# Patient Record
Sex: MALE | Race: WHITE | NOT HISPANIC OR LATINO | Employment: PART TIME | URBAN - METROPOLITAN AREA
[De-identification: names, ages, dates, MRNs, and addresses within clinical notes are randomized per-mention and may not be internally consistent; named-entity substitution may affect disease eponyms.]

---

## 2017-01-26 ENCOUNTER — OFFICE VISIT (OUTPATIENT)
Dept: NEUROLOGY | Facility: CLINIC | Age: 47
End: 2017-01-26

## 2017-01-26 VITALS
DIASTOLIC BLOOD PRESSURE: 70 MMHG | WEIGHT: 178 LBS | OXYGEN SATURATION: 98 % | SYSTOLIC BLOOD PRESSURE: 126 MMHG | BODY MASS INDEX: 26.36 KG/M2 | HEIGHT: 69 IN | HEART RATE: 80 BPM

## 2017-01-26 DIAGNOSIS — G43.019 INTRACTABLE MIGRAINE WITHOUT AURA AND WITHOUT STATUS MIGRAINOSUS: ICD-10-CM

## 2017-01-26 DIAGNOSIS — M54.12 CERVICAL RADICULOPATHY: ICD-10-CM

## 2017-01-26 PROCEDURE — 99213 OFFICE O/P EST LOW 20 MIN: CPT | Performed by: PSYCHIATRY & NEUROLOGY

## 2017-01-26 RX ORDER — SUMATRIPTAN AND NAPROXEN SODIUM 85; 500 MG/1; MG/1
1 TABLET, FILM COATED ORAL
Qty: 12 TABLET | Refills: 4 | Status: SHIPPED | OUTPATIENT
Start: 2017-01-26 | End: 2017-02-25

## 2017-01-26 NOTE — LETTER
January 30, 2017     Jodie Van MD  [address]  [address]  [facility] [address]    Patient: Govind Manriquez   YOB: 1970   Date of Visit: 1/26/2017       Dear Dr. Rehan MD:    Thank you for referring Govind Manriquez to me for evaluation. Below are the relevant portions of my assessment and plan of care.    If you have questions, please do not hesitate to call me. I look forward to following Govind along with you.         Sincerely,        Sanna Barr MD        CC: No Recipients  Sanna Barr MD  1/30/2017 11:16 AM  Sign at close encounter  Subjective   Govind Manriquez is a 46 y.o. male with history of chronic migraines, intractable with cervical radiculopathy issues came for follow-up appointment.    History of Present Illness   He was unaccompanied and according to him, history having 2-3 migraine headaches a week, pain is all over, throbbing nature, severity 7/10, associated with nausea, photophobia and phonophobia and complaint with the medication Treximet and denies any side effects.  Still complaining of neck pain issues and radiation from the neck to the right shoulders but denies any radiation down to the upper extremities and denies any blurred vision, double vision, weakness, tingling numbness or balance problems when walking.  Tried nortriptyline in the past and denies any side effects.  Denies any falls, passing out spells or recent hospitalizations since last visit.    The following portions of the patient's history were reviewed and updated as appropriate: allergies, current medications, past family history, past medical history, past social history, past surgical history and problem list.    Review of Systems   Constitutional: Positive for fatigue. Negative for chills and fever.   HENT: Negative for hearing loss, tinnitus and trouble swallowing.    Eyes: Positive for visual disturbance. Negative for pain and itching.   Respiratory: Negative for shortness of  breath and wheezing.    Cardiovascular: Negative for chest pain and palpitations.   Gastrointestinal: Positive for nausea. Negative for vomiting.   Endocrine: Negative for cold intolerance and heat intolerance.   Genitourinary: Negative for difficulty urinating and urgency.   Musculoskeletal: Negative for back pain, neck pain and neck stiffness.   Skin: Negative for rash and wound.   Allergic/Immunologic: Negative for environmental allergies and food allergies.   Neurological: Positive for dizziness and headaches. Negative for numbness.   Hematological: Negative for adenopathy. Does not bruise/bleed easily.   Psychiatric/Behavioral: Negative for confusion and sleep disturbance. The patient is not nervous/anxious.        Objective   Physical Exam  GENERAL EXAMINATION : Patient is well-developed, well-nourished, well-groomed, alert and approriate in no apparent distress.  HEENT: Normocephalic, normal funduscopic exam, no visible oral lesions.  NECK : Normal range of motion, no tenderness, no malalignment.  CARDIAC : Regular rate and rhythm, no murmurs.  CHEST : Clear to auscultation, bilateral.   No wheezing .  ABDOMEN : Soft, non tender and non distended. EXTREMITIES: No edema. SKIN : No rashes or lesions visible. MUSCULOSKELETAL : No muscle weakness or muscle pain. No joint pains. PSYCHIATRIC : Awake and follwoing verbal commands well.     NEUROLOGICAL EXAMINATION  :  Higher integrative functions : No aphasia or dysarthria.  CRANIAL NERVES : Cranial nerve II: Normal visual acuity and visual fields.  On funduscopic exam, discs are flat with sharp margins cranial nerves III, IV, VI: Extraocular movements are full without nystagmus; pupils are equal, round and reactive to light.  Cranial nerve V: Normal facial sensation and strength of muscles of mastication.  Cranial nerve: VII: Facial movements are symmetric.  No weakness.  Cranial nerve VIII: Auditory acuity is normal.  Cranial nerve IX, X: Symmetric palate  movement.  Cranial nerve XI sternocleidomastoid and trapezius are normal.  Cranial nerve XII: Tongue in the midline with no atrophy or fasciculations.      MOTOR : Normal muscle strength and tone.  SENSATION : Normal to light touch, pinprick, vibration sensations intact and Romberg is negative.  MUSCLE STRETCH REFLEXES : Normal and symmetric in the upper extremities and lower extremities and the plantar responses are flexor bilaterally. COORDINATION : Finger to nose test showed no dysmetria.  Rapid alternating movements are normal.   GAIT : Walks on heels, toes, and tandem walk without difficulty.    Assessment/Plan   Govind was seen today for migraine.    Diagnoses and all orders for this visit:    Intractable migraine without aura and without status migrainosus    Cervical radiculopathy    Other orders  -     SUMAtriptan-naproxen (TREXIMET)  MG per tablet; Take 1 tablet by mouth Every 2 (Two) Hours As Needed for migraine for up to 30 days.     46-year-old right-handed white male with history of chronic migraines, intractable and cervical radiculopathy came for follow-up appointment.  On exam, no new focal deficits were seen.  Discussed with the patient regarding his signs and symptoms and told him to continue Treximet 85/500 mg one tablet by mouth at onset of headaches, can repeat one more tablet in 2 hours but not more than 2 per day and for per week.  Discussed about prophylactic medication patient wants to hold on any medication for now and will consider in future for worsening of his headaches.  Also discussed about physical therapy for neck pain issues but patient wants to hold on therapy for now.  Discussed about migraine triggers and medication or use headaches.  Will follow-up in 6 months or earlier if problems arise.    Thank you for allowing me to participate in the care of the patient.  Please feel free to call for any questions at your convenience.    Yours sincerely,    Sanna Barr M.D

## 2017-01-26 NOTE — MR AVS SNAPSHOT
Govind Manriquez   1/26/2017 2:00 PM   Office Visit    Dept Phone:  [phone]   Encounter #:  [id number]    Provider:  Sanna Barr MD   Department:  [facility] NEUROLOGY                Your Full Care Plan              Your Updated Medication List          This list is accurate as of: 1/26/17  2:08 PM.  Always use your most recent med list.                rosuvastatin 20 MG tablet   Commonly known as:  CRESTOR   Take 1 tablet by mouth Daily.       SUMAtriptan-naproxen  MG per tablet   Commonly known as:  TREXIMET   Take 1 tablet by mouth Every 2 (Two) Hours As Needed for migraine.               Instructions     None    Patient Instructions History      Upcoming Appointments     Visit Type Date Time Department    FOLLOW UP 1/26/2017  2:00 PM [facility]    PHYSICAL 4/25/2017  9:00 AM [facility]    FOLLOW UP 7/27/2017  8:00 AM [facility]      MyChart Signup     Our records indicate that you have an active [employer] account.    You can view your After Visit Summary by going to V[employer] and logging in with your [email or web address] username and password.  If you don't have a [email or web address] username and password but a parent or guardian has access to your record, the parent or guardian should login with their own [email or web address] username and password and access your record to view the After Visit Summary.    If you have questions, you can email [email or web address] or call [phone] to talk to our [email or web address] staff.  Remember, [email or web address] is NOT to be used for urgent needs.  For medical emergencies, dial 911.               Other Info from Your Visit           Your Appointments     Apr 25, 2017  9:00 AM EDT   Physical with Jodie Van MD   [facility] INTERNAL MEDICINE (--)    [address] [place] [address]   [phone]           Arrive 15 minutes prior to appointment.            Jul 27, 2017   "8:00 AM EDT   Follow Up with Sanna Barr MD   [facility] NEUROLOGY (--)    [address]  [facility] [address] [phone]           Arrive 15 minutes prior to appointment.              Allergies     Zocor [Simvastatin]      fatigue      Reason for Visit     Migraine           Vital Signs     Blood Pressure Pulse Height Weight Oxygen Saturation Body Mass Index    126/70 80 69\" (175.3 cm) 178 lb (80.7 kg) 98% 26.29 kg/m2    Smoking Status                   Never Smoker             "

## 2017-01-26 NOTE — PROGRESS NOTES
Subjective   Govind Manriquez is a 46 y.o. male with history of chronic migraines, intractable with cervical radiculopathy issues came for follow-up appointment.    History of Present Illness   He was unaccompanied and according to him, history having 2-3 migraine headaches a week, pain is all over, throbbing nature, severity 7/10, associated with nausea, photophobia and phonophobia and complaint with the medication Treximet and denies any side effects.  Still complaining of neck pain issues and radiation from the neck to the right shoulders but denies any radiation down to the upper extremities and denies any blurred vision, double vision, weakness, tingling numbness or balance problems when walking.  Tried nortriptyline in the past and denies any side effects.  Denies any falls, passing out spells or recent hospitalizations since last visit.    The following portions of the patient's history were reviewed and updated as appropriate: allergies, current medications, past family history, past medical history, past social history, past surgical history and problem list.    Review of Systems   Constitutional: Positive for fatigue. Negative for chills and fever.   HENT: Negative for hearing loss, tinnitus and trouble swallowing.    Eyes: Positive for visual disturbance. Negative for pain and itching.   Respiratory: Negative for shortness of breath and wheezing.    Cardiovascular: Negative for chest pain and palpitations.   Gastrointestinal: Positive for nausea. Negative for vomiting.   Endocrine: Negative for cold intolerance and heat intolerance.   Genitourinary: Negative for difficulty urinating and urgency.   Musculoskeletal: Negative for back pain, neck pain and neck stiffness.   Skin: Negative for rash and wound.   Allergic/Immunologic: Negative for environmental allergies and food allergies.   Neurological: Positive for dizziness and headaches. Negative for numbness.   Hematological: Negative for adenopathy. Does  not bruise/bleed easily.   Psychiatric/Behavioral: Negative for confusion and sleep disturbance. The patient is not nervous/anxious.        Objective   Physical Exam  GENERAL EXAMINATION : Patient is well-developed, well-nourished, well-groomed, alert and approriate in no apparent distress.  HEENT: Normocephalic, normal funduscopic exam, no visible oral lesions.  NECK : Normal range of motion, no tenderness, no malalignment.  CARDIAC : Regular rate and rhythm, no murmurs.  CHEST : Clear to auscultation, bilateral.   No wheezing .  ABDOMEN : Soft, non tender and non distended. EXTREMITIES: No edema. SKIN : No rashes or lesions visible. MUSCULOSKELETAL : No muscle weakness or muscle pain. No joint pains. PSYCHIATRIC : Awake and follwoing verbal commands well.     NEUROLOGICAL EXAMINATION  :  Higher integrative functions : No aphasia or dysarthria.  CRANIAL NERVES : Cranial nerve II: Normal visual acuity and visual fields.  On funduscopic exam, discs are flat with sharp margins cranial nerves III, IV, VI: Extraocular movements are full without nystagmus; pupils are equal, round and reactive to light.  Cranial nerve V: Normal facial sensation and strength of muscles of mastication.  Cranial nerve: VII: Facial movements are symmetric.  No weakness.  Cranial nerve VIII: Auditory acuity is normal.  Cranial nerve IX, X: Symmetric palate movement.  Cranial nerve XI sternocleidomastoid and trapezius are normal.  Cranial nerve XII: Tongue in the midline with no atrophy or fasciculations.      MOTOR : Normal muscle strength and tone.  SENSATION : Normal to light touch, pinprick, vibration sensations intact and Romberg is negative.  MUSCLE STRETCH REFLEXES : Normal and symmetric in the upper extremities and lower extremities and the plantar responses are flexor bilaterally. COORDINATION : Finger to nose test showed no dysmetria.  Rapid alternating movements are normal.   GAIT : Walks on heels, toes, and tandem walk without  difficulty.    Assessment/Plan   Govind was seen today for migraine.    Diagnoses and all orders for this visit:    Intractable migraine without aura and without status migrainosus    Cervical radiculopathy    Other orders  -     SUMAtriptan-naproxen (TREXIMET)  MG per tablet; Take 1 tablet by mouth Every 2 (Two) Hours As Needed for migraine for up to 30 days.     46-year-old right-handed white male with history of chronic migraines, intractable and cervical radiculopathy came for follow-up appointment.  On exam, no new focal deficits were seen.  Discussed with the patient regarding his signs and symptoms and told him to continue Treximet 85/500 mg one tablet by mouth at onset of headaches, can repeat one more tablet in 2 hours but not more than 2 per day and for per week.  Discussed about prophylactic medication patient wants to hold on any medication for now and will consider in future for worsening of his headaches.  Also discussed about physical therapy for neck pain issues but patient wants to hold on therapy for now.  Discussed about migraine triggers and medication or use headaches.  Will follow-up in 6 months or earlier if problems arise.    Thank you for allowing me to participate in the care of the patient.  Please feel free to call for any questions at your convenience.    Yours sincerely,    Sanna Barr M.D

## 2017-04-17 LAB
ALBUMIN SERPL-MCNC: 4.7 G/DL (ref 3.5–5.2)
ALBUMIN/GLOB SERPL: 1.8 G/DL
ALP SERPL-CCNC: 54 U/L (ref 39–117)
ALT SERPL-CCNC: 27 U/L (ref 1–41)
AST SERPL-CCNC: 28 U/L (ref 1–40)
BILIRUB SERPL-MCNC: 0.6 MG/DL (ref 0.1–1.2)
BUN SERPL-MCNC: 17 MG/DL (ref 6–20)
BUN/CREAT SERPL: 16 (ref 7–25)
CALCIUM SERPL-MCNC: 10.2 MG/DL (ref 8.6–10.5)
CHLORIDE SERPL-SCNC: 100 MMOL/L (ref 98–107)
CHOLEST SERPL-MCNC: 276 MG/DL (ref 0–200)
CO2 SERPL-SCNC: 26.4 MMOL/L (ref 22–29)
CREAT SERPL-MCNC: 1.06 MG/DL (ref 0.76–1.27)
GLOBULIN SER CALC-MCNC: 2.6 GM/DL
GLUCOSE SERPL-MCNC: 92 MG/DL (ref 65–99)
HDLC SERPL-MCNC: 55 MG/DL (ref 40–60)
LDLC SERPL CALC-MCNC: 189 MG/DL (ref 0–100)
LDLC/HDLC SERPL: 3.44 {RATIO}
POTASSIUM SERPL-SCNC: 4.6 MMOL/L (ref 3.5–5.2)
PROT SERPL-MCNC: 7.3 G/DL (ref 6–8.5)
SODIUM SERPL-SCNC: 141 MMOL/L (ref 136–145)
TRIGL SERPL-MCNC: 160 MG/DL (ref 0–150)
TSH SERPL DL<=0.005 MIU/L-ACNC: 1.12 MIU/ML (ref 0.27–4.2)
VLDLC SERPL CALC-MCNC: 32 MG/DL (ref 5–40)

## 2017-04-20 ENCOUNTER — RESULTS ENCOUNTER (OUTPATIENT)
Dept: INTERNAL MEDICINE | Facility: CLINIC | Age: 47
End: 2017-04-20

## 2017-04-20 DIAGNOSIS — E78.5 HYPERLIPIDEMIA, UNSPECIFIED HYPERLIPIDEMIA TYPE: ICD-10-CM

## 2017-04-25 ENCOUNTER — HOSPITAL ENCOUNTER (OUTPATIENT)
Dept: CARDIOLOGY | Facility: HOSPITAL | Age: 47
Discharge: HOME | End: 2017-04-25
Admitting: INTERNAL MEDICINE

## 2017-04-25 ENCOUNTER — HOSPITAL ENCOUNTER (OUTPATIENT)
Dept: ULTRASOUND IMAGING | Facility: HOSPITAL | Age: 47
Discharge: HOME | End: 2017-04-25
Admitting: INTERNAL MEDICINE

## 2017-04-25 ENCOUNTER — OFFICE VISIT (OUTPATIENT)
Dept: INTERNAL MEDICINE | Facility: CLINIC | Age: 47
End: 2017-04-25

## 2017-04-25 ENCOUNTER — TRANSCRIBE ORDERS (OUTPATIENT)
Dept: ADMINISTRATIVE | Facility: HOSPITAL | Age: 47
End: 2017-04-25

## 2017-04-25 VITALS
DIASTOLIC BLOOD PRESSURE: 74 MMHG | SYSTOLIC BLOOD PRESSURE: 110 MMHG | HEIGHT: 70 IN | HEART RATE: 56 BPM | BODY MASS INDEX: 26.48 KG/M2 | WEIGHT: 185 LBS

## 2017-04-25 VITALS
HEIGHT: 69 IN | SYSTOLIC BLOOD PRESSURE: 110 MMHG | OXYGEN SATURATION: 95 % | BODY MASS INDEX: 27.61 KG/M2 | DIASTOLIC BLOOD PRESSURE: 80 MMHG | HEART RATE: 91 BPM | WEIGHT: 186.4 LBS

## 2017-04-25 DIAGNOSIS — L21.9 SEBORRHEIC DERMATITIS: ICD-10-CM

## 2017-04-25 DIAGNOSIS — G43.019 INTRACTABLE MIGRAINE WITHOUT AURA AND WITHOUT STATUS MIGRAINOSUS: ICD-10-CM

## 2017-04-25 DIAGNOSIS — Z00.00 HEALTH CARE MAINTENANCE: Primary | ICD-10-CM

## 2017-04-25 DIAGNOSIS — Z13.9 SCREENING: ICD-10-CM

## 2017-04-25 DIAGNOSIS — E04.1 THYROID NODULE: ICD-10-CM

## 2017-04-25 DIAGNOSIS — Z13.9 SCREENING: Primary | ICD-10-CM

## 2017-04-25 DIAGNOSIS — E78.5 HYPERLIPIDEMIA, UNSPECIFIED HYPERLIPIDEMIA TYPE: ICD-10-CM

## 2017-04-25 LAB
BH CV ECHO MEAS - DIST AO DIAM: 1.27 CM
BH CV VAS BP LEFT ARM: NORMAL MMHG
BH CV VAS BP RIGHT ARM: NORMAL MMHG
BH CV XLRA MEAS - MID AO DIAM: 1.62 CM
BH CV XLRA MEAS - PAD LEFT ABI DP: 1.16
BH CV XLRA MEAS - PAD LEFT ABI PT: 1.12
BH CV XLRA MEAS - PAD LEFT ARM: 106 MMHG
BH CV XLRA MEAS - PAD LEFT LEG DP: 128 MMHG
BH CV XLRA MEAS - PAD LEFT LEG PT: 124 MMHG
BH CV XLRA MEAS - PAD RIGHT ABI DP: 1.18
BH CV XLRA MEAS - PAD RIGHT ABI PT: 1.14
BH CV XLRA MEAS - PAD RIGHT ARM: 110 MMHG
BH CV XLRA MEAS - PAD RIGHT LEG DP: 130 MMHG
BH CV XLRA MEAS - PAD RIGHT LEG PT: 126 MMHG
BH CV XLRA MEAS - PROX AO DIAM: 2.03 CM
BH CV XLRA MEAS LEFT ICA/CCA RATIO: 0.87
BH CV XLRA MEAS LEFT MID CCA PSV: NORMAL CM/SEC
BH CV XLRA MEAS LEFT MID ICA PSV: NORMAL CM/SEC
BH CV XLRA MEAS LEFT PROX ECA PSV: NORMAL CM/SEC
BH CV XLRA MEAS RIGHT ICA/CCA RATIO: 1.28
BH CV XLRA MEAS RIGHT MID CCA PSV: NORMAL CM/SEC
BH CV XLRA MEAS RIGHT MID ICA PSV: NORMAL CM/SEC
BH CV XLRA MEAS RIGHT PROX ECA PSV: NORMAL CM/SEC

## 2017-04-25 PROCEDURE — 76536 US EXAM OF HEAD AND NECK: CPT

## 2017-04-25 PROCEDURE — 99396 PREV VISIT EST AGE 40-64: CPT | Performed by: INTERNAL MEDICINE

## 2017-04-25 PROCEDURE — 93799 UNLISTED CV SVC/PROCEDURE: CPT

## 2017-04-25 PROCEDURE — 99213 OFFICE O/P EST LOW 20 MIN: CPT | Performed by: INTERNAL MEDICINE

## 2017-04-25 PROCEDURE — 93000 ELECTROCARDIOGRAM COMPLETE: CPT | Performed by: INTERNAL MEDICINE

## 2017-04-25 RX ORDER — KETOCONAZOLE 20 MG/ML
1 SHAMPOO TOPICAL 2 TIMES WEEKLY
COMMUNITY
End: 2017-04-25 | Stop reason: SDUPTHER

## 2017-04-25 RX ORDER — SUMATRIPTAN AND NAPROXEN SODIUM 85; 500 MG/1; MG/1
1 TABLET, FILM COATED ORAL
COMMUNITY
End: 2017-12-18 | Stop reason: SDUPTHER

## 2017-04-25 RX ORDER — KETOCONAZOLE 20 MG/ML
1 SHAMPOO TOPICAL 2 TIMES WEEKLY
Qty: 120 ML | Refills: 1 | Status: SHIPPED | OUTPATIENT
Start: 2017-04-27 | End: 2021-02-19

## 2017-04-25 RX ORDER — ROSUVASTATIN CALCIUM 20 MG/1
20 TABLET, COATED ORAL DAILY
Qty: 90 TABLET | Refills: 3 | Status: SHIPPED | OUTPATIENT
Start: 2017-04-25 | End: 2018-05-03 | Stop reason: SDUPTHER

## 2017-04-25 NOTE — PROGRESS NOTES
"Subjective   Govind Manriquez is a 46 y.o. male and is here for a comprehensive physical exam. The patient reports problems - migraine HA.    Likely at least in part related to cervical radiculopathy.  He is having HA 1-2x a week.  The treximet does help and he does not want to take a prophyllactic  HE has had elevated cholesterol.  HE has not been taking the cholesterol meds reg as he missed his meds for spring break  He does have occas seb derm and uses occas niroal.  He uses a bottle every 2 years      Do you take any herbs or supplements that were not prescribed by a doctor? occas uses 81mg     Social History: HE does exercise reg.    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    Objective   /80 (BP Location: Left arm, Patient Position: Sitting)  Pulse 91  Ht 69\" (175.3 cm)  Wt 186 lb 6.4 oz (84.6 kg)  SpO2 95%  BMI 27.53 kg/m2    General Appearance:    Alert, cooperative, no distress, appears stated age   Head:    Normocephalic, without obvious abnormality, atraumatic   Eyes:    PERRL, conjunctiva/corneas clear, EOM's intact, fundi     benign, both eyes        Ears:    Normal TM's and external ear canals, both ears   Nose:   Nares normal, septum midline, mucosa normal, no drainage    or sinus " tenderness   Throat:   Lips, mucosa, and tongue normal; teeth and gums normal   Neck:   Supple, symmetrical, trachea midline, no adenopathy;        thyroid:  No enlargement/tenderness/nodules; no carotid    bruit or JVD   Back:     Symmetric, no curvature, ROM normal, no CVA tenderness   Lungs:     Clear to auscultation bilaterally, respirations unlabored   Chest wall:    No tenderness or deformity   Heart:    Regular rate and rhythm, S1 and S2 normal, no murmur, rub   or gallop   Abdomen:     Soft, non-tender, bowel sounds active all four quadrants,     no masses, no organomegaly           Extremities:   Extremities normal, atraumatic, no cyanosis or edema   Pulses:   2+ and symmetric all extremities   Skin:   Skin color, texture, turgor normal, no rashes or lesions   Lymph nodes:   Cervical, supraclavicular, and axillary nodes normal   Neurologic:   CNII-XII intact. Normal strength, sensation and reflexes       throughout       Medications:   Current Outpatient Prescriptions:   •  ketoconazole (NIZORAL) 2 % shampoo, Apply 1 application topically 2 (Two) Times a Week., Disp: , Rfl:   •  rosuvastatin (CRESTOR) 20 MG tablet, Take 1 tablet by mouth Daily., Disp: 90 tablet, Rfl: 3  •  SUMAtriptan-naproxen (TREXIMET)  MG per tablet, Take 1 tablet by mouth Every 2 (Two) Hours As Needed for migraine., Disp: , Rfl:      EKG: Normal sinus rhythm without any acute ST changes.  There is no baseline available for comparison    Assessment/Plan   Healthy male exam.      1. Healthcare Maintenance:  2. Patient Counseling:  --Nutrition: Stressed importance of moderation in sodium/caffeine intake, saturated fat and cholesterol, caloric balance, sufficient intake of fresh fruits, vegetables, fiber, calcium and vit D  --Exercise: Stressed the importance of regular exercise. rec 30 minutes a day   --Dental health: Discussed importance of regular tooth brushing, flossing, and dental visits he does this  --Immunizations reviewed.  Gets flu shot  --Discussed benefits of screening colonoscopy start at age 50  3. HPL- he is going to be more compliant with His Crestor.  He does have a significant family history of coronary artery disease we will check a vascular screen   4. Thyroid nodule-  We'll follow this up a couple of times.  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  We will repeat this next month   5. seb derm-  I have refilled with ketoconazole He uses this only occasionally   6.  Migraine headaches: Patient is still having a couple week.  He also has cervical radiculopathy.  He is seen the neurologist and I have also recommended some physical therapy as I believe that will help him with these headaches.  He will me know if he changes his mind

## 2017-04-27 DIAGNOSIS — E04.1 THYROID NODULE: Primary | ICD-10-CM

## 2017-09-08 ENCOUNTER — TELEPHONE (OUTPATIENT)
Dept: NEUROLOGY | Facility: CLINIC | Age: 47
End: 2017-09-08

## 2017-09-08 DIAGNOSIS — G43.719 INTRACTABLE CHRONIC MIGRAINE WITHOUT AURA AND WITHOUT STATUS MIGRAINOSUS: Primary | ICD-10-CM

## 2017-09-08 NOTE — TELEPHONE ENCOUNTER
----- Message from Barbara Stephenson sent at 9/8/2017 10:44 AM EDT -----  Contact: [phone]  Patient would like to be moved to [facility].       Sent referral.

## 2017-10-20 DIAGNOSIS — E78.5 HYPERLIPIDEMIA, UNSPECIFIED HYPERLIPIDEMIA TYPE: ICD-10-CM

## 2017-11-02 LAB
ALBUMIN SERPL-MCNC: 4.5 G/DL (ref 3.5–5.2)
ALBUMIN/GLOB SERPL: 1.6 G/DL
ALP SERPL-CCNC: 63 U/L (ref 39–117)
ALT SERPL-CCNC: 27 U/L (ref 1–41)
AST SERPL-CCNC: 30 U/L (ref 1–40)
BILIRUB SERPL-MCNC: 0.5 MG/DL (ref 0.1–1.2)
BUN SERPL-MCNC: 14 MG/DL (ref 6–20)
BUN/CREAT SERPL: 13.5 (ref 7–25)
CALCIUM SERPL-MCNC: 10 MG/DL (ref 8.6–10.5)
CHLORIDE SERPL-SCNC: 101 MMOL/L (ref 98–107)
CHOLEST SERPL-MCNC: 245 MG/DL (ref 0–200)
CO2 SERPL-SCNC: 26.6 MMOL/L (ref 22–29)
CREAT SERPL-MCNC: 1.04 MG/DL (ref 0.76–1.27)
GFR SERPLBLD CREATININE-BSD FMLA CKD-EPI: 77 ML/MIN/1.73
GFR SERPLBLD CREATININE-BSD FMLA CKD-EPI: 93 ML/MIN/1.73
GLOBULIN SER CALC-MCNC: 2.9 GM/DL
GLUCOSE SERPL-MCNC: 102 MG/DL (ref 65–99)
HDLC SERPL-MCNC: 61 MG/DL (ref 40–60)
LDLC SERPL CALC-MCNC: 163 MG/DL (ref 0–100)
LDLC/HDLC SERPL: 2.67 {RATIO}
POTASSIUM SERPL-SCNC: 4.5 MMOL/L (ref 3.5–5.2)
PROT SERPL-MCNC: 7.4 G/DL (ref 6–8.5)
SODIUM SERPL-SCNC: 141 MMOL/L (ref 136–145)
TRIGL SERPL-MCNC: 105 MG/DL (ref 0–150)
VLDLC SERPL CALC-MCNC: 21 MG/DL (ref 5–40)

## 2017-11-08 ENCOUNTER — OFFICE VISIT (OUTPATIENT)
Dept: INTERNAL MEDICINE | Facility: CLINIC | Age: 47
End: 2017-11-08

## 2017-11-08 VITALS
BODY MASS INDEX: 26.92 KG/M2 | OXYGEN SATURATION: 96 % | DIASTOLIC BLOOD PRESSURE: 72 MMHG | SYSTOLIC BLOOD PRESSURE: 112 MMHG | HEIGHT: 70 IN | WEIGHT: 188 LBS | HEART RATE: 75 BPM

## 2017-11-08 DIAGNOSIS — G43.019 INTRACTABLE MIGRAINE WITHOUT AURA AND WITHOUT STATUS MIGRAINOSUS: ICD-10-CM

## 2017-11-08 DIAGNOSIS — E78.5 HYPERLIPIDEMIA, UNSPECIFIED HYPERLIPIDEMIA TYPE: Primary | ICD-10-CM

## 2017-11-08 DIAGNOSIS — M54.2 NECK PAIN: ICD-10-CM

## 2017-11-08 PROCEDURE — 99214 OFFICE O/P EST MOD 30 MIN: CPT | Performed by: INTERNAL MEDICINE

## 2017-12-18 ENCOUNTER — OFFICE VISIT (OUTPATIENT)
Dept: NEUROLOGY | Facility: CLINIC | Age: 47
End: 2017-12-18

## 2017-12-18 VITALS
HEIGHT: 69 IN | WEIGHT: 187 LBS | SYSTOLIC BLOOD PRESSURE: 122 MMHG | DIASTOLIC BLOOD PRESSURE: 78 MMHG | BODY MASS INDEX: 27.7 KG/M2

## 2017-12-18 DIAGNOSIS — G43.019 INTRACTABLE MIGRAINE WITHOUT AURA AND WITHOUT STATUS MIGRAINOSUS: ICD-10-CM

## 2017-12-18 DIAGNOSIS — M54.12 CERVICAL RADICULOPATHY: ICD-10-CM

## 2017-12-18 DIAGNOSIS — M54.2 NECK PAIN: Primary | ICD-10-CM

## 2017-12-18 PROCEDURE — 99214 OFFICE O/P EST MOD 30 MIN: CPT | Performed by: PSYCHIATRY & NEUROLOGY

## 2017-12-18 RX ORDER — TOPIRAMATE 25 MG/1
25 TABLET ORAL NIGHTLY
Qty: 30 TABLET | Refills: 2 | Status: SHIPPED | OUTPATIENT
Start: 2017-12-18 | End: 2018-01-02 | Stop reason: SDUPTHER

## 2017-12-18 RX ORDER — SUMATRIPTAN AND NAPROXEN SODIUM 85; 500 MG/1; MG/1
1 TABLET, FILM COATED ORAL
Qty: 9 TABLET | Refills: 11 | Status: SHIPPED | OUTPATIENT
Start: 2017-12-18 | End: 2018-06-18 | Stop reason: SDUPTHER

## 2017-12-18 NOTE — PROGRESS NOTES
Subjective:     Patient ID: Govind Manriquez is a 47 y.o. male.    History of Present Illness     Patient was seen in the office for follow-up of neck pain and migraine.  This is the first time I've seen this patient.  He has seen Dr. Barr in the past.  For neck pain he was prescribed physical therapy at his last visit here in January 17.  He did not go.  He is taking more more Treximet.  Used to take 1 or 2 months now is taking to 3 a week.  He usually has neck pain which is chronic.  MRI of the cervical spine was done in May 2016.  His headaches usually have nausea and also some visual disturbance with them.  Treximet helps for about days and the headache oftentimes will come back.  He has been on amitriptyline and nortriptyline in the past without benefit.  The following portions of the patient's history were reviewed and updated as appropriate: allergies, current medications, past family history, past medical history, past social history, past surgical history and problem list.      Current Outpatient Prescriptions:   •  ketoconazole (NIZORAL) 2 % shampoo, Apply 1 application topically 2 (Two) Times a Week., Disp: 120 mL, Rfl: 1  •  rosuvastatin (CRESTOR) 20 MG tablet, Take 1 tablet by mouth Daily., Disp: 90 tablet, Rfl: 3  •  SUMAtriptan-naproxen (TREXIMET)  MG per tablet, Take 1 tablet by mouth Every 2 (Two) Hours As Needed for Migraine., Disp: 9 tablet, Rfl: 11  •  topiramate (TOPAMAX) 25 MG tablet, Take 1 tablet by mouth Every Night., Disp: 30 tablet, Rfl: 2    Review of Systems   Constitutional: Negative.    Neurological: Positive for dizziness and headaches. Negative for tremors, seizures, syncope, speech difficulty, weakness, light-headedness and numbness.   Psychiatric/Behavioral: Negative.         Objective:    Neurologic Exam  Mental status was appropriate for age.  Fundoscopy showed no papilledema. Visual fields were full to OKN.  Eye movements were full and conjugate.  Pupillary reflexes  were mid-range and symmetric.  No facial weakness was noted.  Tongue was midline.  There was no pattern of focal weakness.  Gait was appropriate for age.  No pathologic reflexes were noted.  No cerebellar signs were noted.  Tone was normal.  Deep tendon reflexes were 2+ and symmetric.  Physical Exam    Assessment/Plan:     Govind was seen today for migraine.    Diagnoses and all orders for this visit:    Neck pain  -     Ambulatory Referral to Neurosurgery    Cervical radiculopathy    Intractable migraine without aura and without status migrainosus    Other orders  -     SUMAtriptan-naproxen (TREXIMET)  MG per tablet; Take 1 tablet by mouth Every 2 (Two) Hours As Needed for Migraine.  -     topiramate (TOPAMAX) 25 MG tablet; Take 1 tablet by mouth Every Night.     He continues to have ongoing problems with neck pain.  I feel it best to get a neurosurgical opinion.  Headaches are a mixture of muscle contraction and migraine.  I discussed alternatives of treatment for this.  I started him on topiramate 25 mg at night.  Zanaflex would be the other alternative.    Prescription drug management - meds as above    Call for follow-up in one month by phone.  Follow-up in the office in 6 months. Thank you for allowing me to share in the care of this patient.  Austin Anthony M.D.

## 2018-01-02 RX ORDER — TOPIRAMATE 25 MG/1
25 TABLET ORAL NIGHTLY
Qty: 30 TABLET | Refills: 2 | Status: SHIPPED | OUTPATIENT
Start: 2018-01-02 | End: 2018-02-15

## 2018-02-13 ENCOUNTER — TELEPHONE (OUTPATIENT)
Dept: NEUROSURGERY | Facility: CLINIC | Age: 48
End: 2018-02-13

## 2018-02-13 NOTE — TELEPHONE ENCOUNTER
Called the patient to check the status of their new patient packet, as we have not received it and we need it back 3 business days prior to their appointment or their appointment will be canceled. Spoke with the patient and he actually works at [employer] and said he can get the papers to us tomorrow when he returns to work but maybe today. He will attempt to get them to us today but might be tomorrow at the latest.

## 2018-02-15 ENCOUNTER — OFFICE VISIT (OUTPATIENT)
Dept: NEUROSURGERY | Facility: CLINIC | Age: 48
End: 2018-02-15

## 2018-02-15 VITALS
HEART RATE: 90 BPM | HEIGHT: 69 IN | BODY MASS INDEX: 27.4 KG/M2 | DIASTOLIC BLOOD PRESSURE: 73 MMHG | SYSTOLIC BLOOD PRESSURE: 161 MMHG | WEIGHT: 185 LBS

## 2018-02-15 DIAGNOSIS — M54.2 NECK PAIN: Primary | ICD-10-CM

## 2018-02-15 PROCEDURE — 99243 OFF/OP CNSLTJ NEW/EST LOW 30: CPT | Performed by: NEUROLOGICAL SURGERY

## 2018-02-15 NOTE — PROGRESS NOTES
Subjective   Patient ID: Govind Manriquez is a 47 y.o. male is being seen for consultation today at the request of Austin Anthony Jr., * for neck pain with headaches.     History of Present Illness     This patient has been having fairly severe pain in his neck and radiation up into the back of his head.  It is on both sides but it is worse on the right left.  This is been going on for over 10 years.  There is no radiation into his arms.  He has no difficulty with bowel or bladder control or other associated symptoms.  He has had no specific treatment for this up to this point other than medications.  He says that activity seems to make the pain worse and nothing really makes it better.    The following portions of the patient's history were reviewed and updated as appropriate: allergies, current medications, past family history, past medical history, past social history, past surgical history and problem list.    Review of Systems   Respiratory: Negative for chest tightness and shortness of breath.    Cardiovascular: Negative for chest pain.   Musculoskeletal: Positive for arthralgias ( Bilateral shoulder pain), myalgias, neck pain and neck stiffness.   Neurological: Positive for headaches. Negative for weakness and numbness.   All other systems reviewed and are negative.      Objective   Physical Exam   Constitutional: He is oriented to person, place, and time. He appears well-developed and well-nourished.   HENT:   Head: Normocephalic and atraumatic.   Eyes: Conjunctivae and EOM are normal. Pupils are equal, round, and reactive to light.   Fundoscopic exam:       The right eye shows no papilledema. The right eye shows venous pulsations.        The left eye shows no papilledema. The left eye shows venous pulsations.   Neck: Carotid bruit is not present.   Neurological: He is oriented to person, place, and time. He has a normal Finger-Nose-Finger Test and a normal Heel to Shin Test. Gait normal.   Reflex  Scores:       Tricep reflexes are 2+ on the right side and 2+ on the left side.       Bicep reflexes are 2+ on the right side and 2+ on the left side.       Brachioradialis reflexes are 2+ on the right side and 2+ on the left side.       Patellar reflexes are 2+ on the right side and 2+ on the left side.       Achilles reflexes are 2+ on the right side and 2+ on the left side.  Psychiatric: His speech is normal.     Neurologic Exam     Mental Status   Oriented to person, place, and time.   Registration of memory: Good recent and remote memory.   Attention: normal. Concentration: normal.   Speech: speech is normal   Level of consciousness: alert  Knowledge: consistent with education.     Cranial Nerves     CN II   Visual fields full to confrontation.   Visual acuity: normal    CN III, IV, VI   Pupils are equal, round, and reactive to light.  Extraocular motions are normal.     CN V   Facial sensation intact.   Right corneal reflex: normal  Left corneal reflex: normal    CN VII   Facial expression full, symmetric.   Right facial weakness: none  Left facial weakness: none    CN VIII   Hearing: intact    CN IX, X   Palate: symmetric    CN XI   Right sternocleidomastoid strength: normal  Left sternocleidomastoid strength: normal    CN XII   Tongue: not atrophic  Tongue deviation: none    Motor Exam   Muscle bulk: normal  Right arm tone: normal  Left arm tone: normal  Right leg tone: normal  Left leg tone: normal    Strength   Strength 5/5 except as noted.     Sensory Exam   Light touch normal.     Gait, Coordination, and Reflexes     Gait  Gait: normal    Coordination   Finger to nose coordination: normal  Heel to shin coordination: normal    Reflexes   Right brachioradialis: 2+  Left brachioradialis: 2+  Right biceps: 2+  Left biceps: 2+  Right triceps: 2+  Left triceps: 2+  Right patellar: 2+  Left patellar: 2+  Right achilles: 2+  Left achilles: 2+  Right : 2+  Left : 2+      Assessment/Plan   Independent  Review of Radiographic Studies:      I reviewed an MRI of his cervical spine done 2 years ago in May 2016.  This shows widely patent foramina and spinal canal at C2 3.  There is a little narrowing of the foramina at C3 4 but not severe.  C4 5 and C5 6 are widely open as is C6 7 and C7-T1.    Medical Decision Making:      I told the patient that I thought we should get a new MRI at this point since this one is almost 2 years old.  I will call him with those results but if they look okay then I think we can get him into see a pain management doctor for either some epidural blocks or some facet injections or a combination of ball.      Govind was seen today for neck pain and arm pain.    Diagnoses and all orders for this visit:    Neck pain  -     MRI Cervical Spine Without Contrast; Future  -     Ambulatory Referral to Pain Management    Return for Call with results.

## 2018-02-26 ENCOUNTER — APPOINTMENT (OUTPATIENT)
Dept: MRI IMAGING | Facility: HOSPITAL | Age: 48
End: 2018-02-26
Attending: NEUROLOGICAL SURGERY

## 2018-05-02 DIAGNOSIS — E78.5 HYPERLIPIDEMIA, UNSPECIFIED HYPERLIPIDEMIA TYPE: ICD-10-CM

## 2018-05-02 DIAGNOSIS — G43.019 INTRACTABLE MIGRAINE WITHOUT AURA AND WITHOUT STATUS MIGRAINOSUS: ICD-10-CM

## 2018-05-03 DIAGNOSIS — E78.5 HYPERLIPIDEMIA, UNSPECIFIED HYPERLIPIDEMIA TYPE: ICD-10-CM

## 2018-05-03 LAB
ALBUMIN SERPL-MCNC: 5 G/DL (ref 3.5–5.2)
ALBUMIN/GLOB SERPL: 2.3 G/DL
ALP SERPL-CCNC: 59 U/L (ref 39–117)
ALT SERPL-CCNC: 29 U/L (ref 1–41)
AST SERPL-CCNC: 34 U/L (ref 1–40)
BASOPHILS # BLD AUTO: 0.11 10*3/MM3 (ref 0–0.2)
BASOPHILS NFR BLD AUTO: 1.4 % (ref 0–1.5)
BILIRUB SERPL-MCNC: 0.5 MG/DL (ref 0.1–1.2)
BUN SERPL-MCNC: 12 MG/DL (ref 6–20)
BUN/CREAT SERPL: 12 (ref 7–25)
CALCIUM SERPL-MCNC: 10.1 MG/DL (ref 8.6–10.5)
CHLORIDE SERPL-SCNC: 102 MMOL/L (ref 98–107)
CHOLEST SERPL-MCNC: 275 MG/DL (ref 0–200)
CO2 SERPL-SCNC: 26.5 MMOL/L (ref 22–29)
CREAT SERPL-MCNC: 1 MG/DL (ref 0.76–1.27)
EOSINOPHIL # BLD AUTO: 0.31 10*3/MM3 (ref 0–0.7)
EOSINOPHIL NFR BLD AUTO: 4.1 % (ref 0.3–6.2)
ERYTHROCYTE [DISTWIDTH] IN BLOOD BY AUTOMATED COUNT: 14.4 % (ref 11.5–14.5)
GFR SERPLBLD CREATININE-BSD FMLA CKD-EPI: 80 ML/MIN/1.73
GFR SERPLBLD CREATININE-BSD FMLA CKD-EPI: 97 ML/MIN/1.73
GLOBULIN SER CALC-MCNC: 2.2 GM/DL
GLUCOSE SERPL-MCNC: 103 MG/DL (ref 65–99)
HCT VFR BLD AUTO: 47.9 % (ref 40.4–52.2)
HDLC SERPL-MCNC: 60 MG/DL (ref 40–60)
HGB BLD-MCNC: 15.4 G/DL (ref 13.7–17.6)
IMM GRANULOCYTES # BLD: 0.02 10*3/MM3 (ref 0–0.03)
IMM GRANULOCYTES NFR BLD: 0.3 % (ref 0–0.5)
LDLC SERPL CALC-MCNC: 195 MG/DL (ref 0–100)
LDLC/HDLC SERPL: 3.25 {RATIO}
LYMPHOCYTES # BLD AUTO: 3.18 10*3/MM3 (ref 0.9–4.8)
LYMPHOCYTES NFR BLD AUTO: 41.8 % (ref 19.6–45.3)
MCH RBC QN AUTO: 31.4 PG (ref 27–32.7)
MCHC RBC AUTO-ENTMCNC: 32.2 G/DL (ref 32.6–36.4)
MCV RBC AUTO: 97.6 FL (ref 79.8–96.2)
MONOCYTES # BLD AUTO: 0.61 10*3/MM3 (ref 0.2–1.2)
MONOCYTES NFR BLD AUTO: 8 % (ref 5–12)
NEUTROPHILS # BLD AUTO: 3.4 10*3/MM3 (ref 1.9–8.1)
NEUTROPHILS NFR BLD AUTO: 44.7 % (ref 42.7–76)
PLATELET # BLD AUTO: 251 10*3/MM3 (ref 140–500)
POTASSIUM SERPL-SCNC: 5.2 MMOL/L (ref 3.5–5.2)
PROT SERPL-MCNC: 7.2 G/DL (ref 6–8.5)
RBC # BLD AUTO: 4.91 10*6/MM3 (ref 4.6–6)
SODIUM SERPL-SCNC: 143 MMOL/L (ref 136–145)
TRIGL SERPL-MCNC: 101 MG/DL (ref 0–150)
TSH SERPL DL<=0.005 MIU/L-ACNC: 1.29 MIU/ML (ref 0.27–4.2)
VLDLC SERPL CALC-MCNC: 20.2 MG/DL (ref 5–40)
WBC # BLD AUTO: 7.61 10*3/MM3 (ref 4.5–10.7)

## 2018-05-03 RX ORDER — ROSUVASTATIN CALCIUM 20 MG/1
20 TABLET, COATED ORAL DAILY
Qty: 90 TABLET | Refills: 1 | Status: SHIPPED | OUTPATIENT
Start: 2018-05-03 | End: 2018-05-10 | Stop reason: SDUPTHER

## 2018-05-09 ENCOUNTER — TELEPHONE (OUTPATIENT)
Dept: NEUROSURGERY | Facility: CLINIC | Age: 48
End: 2018-05-09

## 2018-05-09 NOTE — TELEPHONE ENCOUNTER
Patient is feeling better his neck pain has improved, he was seeing the pain specialist and they recommended an CELINA which he also cancelled. He does not want to get the CELINA nor do the MRI as he is not having any problems currently. He will call the office with any future problems.

## 2018-05-10 ENCOUNTER — OFFICE VISIT (OUTPATIENT)
Dept: INTERNAL MEDICINE | Facility: CLINIC | Age: 48
End: 2018-05-10

## 2018-05-10 VITALS
WEIGHT: 191.1 LBS | SYSTOLIC BLOOD PRESSURE: 108 MMHG | TEMPERATURE: 99.3 F | HEART RATE: 78 BPM | OXYGEN SATURATION: 95 % | DIASTOLIC BLOOD PRESSURE: 70 MMHG | HEIGHT: 69 IN | BODY MASS INDEX: 28.3 KG/M2

## 2018-05-10 DIAGNOSIS — E04.1 THYROID NODULE: ICD-10-CM

## 2018-05-10 DIAGNOSIS — E78.5 HYPERLIPIDEMIA, UNSPECIFIED HYPERLIPIDEMIA TYPE: Primary | ICD-10-CM

## 2018-05-10 DIAGNOSIS — Z00.00 HEALTH CARE MAINTENANCE: ICD-10-CM

## 2018-05-10 DIAGNOSIS — G43.019 INTRACTABLE MIGRAINE WITHOUT AURA AND WITHOUT STATUS MIGRAINOSUS: ICD-10-CM

## 2018-05-10 PROCEDURE — 99396 PREV VISIT EST AGE 40-64: CPT | Performed by: INTERNAL MEDICINE

## 2018-05-10 RX ORDER — ROSUVASTATIN CALCIUM 20 MG/1
20 TABLET, COATED ORAL DAILY
Qty: 90 TABLET | Refills: 1 | Status: SHIPPED | OUTPATIENT
Start: 2018-05-10 | End: 2018-11-02 | Stop reason: SDUPTHER

## 2018-05-10 NOTE — PROGRESS NOTES
"Subjective   Govind Manriquez is a 47 y.o. male and is here for a comprehensive physical exam. The patient reports problems - hpl.    He is not very compliant with his crestor.  He forgets a lot  HE is seeing NS for his neck trouble    Do you take any herbs or supplements that were not prescribed by a doctor? 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    Objective   /70 (BP Location: Left arm, Patient Position: Sitting)   Pulse 78   Temp 99.3 °F (37.4 °C) (Oral)   Ht 175.3 cm (69\")   Wt 86.7 kg (191 lb 1.6 oz)   SpO2 95%   BMI 28.22 kg/m²     General Appearance:    Alert, cooperative, no distress, appears stated age   Head:    Normocephalic, without obvious abnormality, atraumatic   Eyes:    PERRL, conjunctiva/corneas clear, EOM's intact, fundi     benign, both eyes        Ears:    Normal TM's and external ear canals, both ears   Nose:   Nares normal, septum midline, mucosa normal, no drainage    or sinus tenderness   Throat:   Lips, mucosa, and tongue normal; teeth and gums normal   Neck:   Supple, symmetrical, trachea midline, no adenopathy;        thyroid:  No " enlargement/tenderness/nodules; no carotid    bruit or JVD   Back:     Symmetric, no curvature, ROM normal, no CVA tenderness   Lungs:     Clear to auscultation bilaterally, respirations unlabored   Chest wall:    No tenderness or deformity   Heart:    Regular rate and rhythm, S1 and S2 normal, no murmur, rub   or gallop   Abdomen:     Soft, non-tender, bowel sounds active all four quadrants,     no masses, no organomegaly           Extremities:   Extremities normal, atraumatic, no cyanosis or edema   Pulses:   2+ and symmetric all extremities   Skin:   Skin color, texture, turgor normal, no rashes or lesions   Lymph nodes:   Cervical, supraclavicular, and axillary nodes normal   Neurologic:   CNII-XII intact. Normal strength, sensation and reflexes       throughout       Medications:   Current Outpatient Prescriptions:   •  ketoconazole (NIZORAL) 2 % shampoo, Apply 1 application topically 2 (Two) Times a Week., Disp: 120 mL, Rfl: 1  •  rosuvastatin (CRESTOR) 20 MG tablet, TAKE 1 TABLET BY MOUTH DAILY., Disp: 90 tablet, Rfl: 1  •  SUMAtriptan-naproxen (TREXIMET)  MG per tablet, Take 1 tablet by mouth Every 2 (Two) Hours As Needed for Migraine., Disp: 9 tablet, Rfl: 11       Assessment/Plan   Healthy male exam.      1. Healthcare Maintenance:  2. Patient Counseling:  --Nutrition: Stressed importance of moderation in sodium/caffeine intake, saturated fat and cholesterol, caloric balance, sufficient intake of fresh fruits, vegetables, fiber, calcium and vit D  --Exercise: no reg exercise but he plans to start back  --Substance Abuse:no tob rare etoh   --Dental health: He does go to the dentist reg  --Immunizations reviewed.  --Discussed benefits of screening colonoscopy.  3.  HPL-  He is going to work on diet and exercise

## 2018-05-17 ENCOUNTER — HOSPITAL ENCOUNTER (OUTPATIENT)
Dept: ULTRASOUND IMAGING | Facility: HOSPITAL | Age: 48
Discharge: HOME | End: 2018-05-17
Admitting: INTERNAL MEDICINE

## 2018-05-17 PROCEDURE — 76536 US EXAM OF HEAD AND NECK: CPT

## 2018-05-21 DIAGNOSIS — E04.1 THYROID NODULE: Primary | ICD-10-CM

## 2018-06-18 ENCOUNTER — OFFICE VISIT (OUTPATIENT)
Dept: NEUROLOGY | Facility: CLINIC | Age: 48
End: 2018-06-18

## 2018-06-18 VITALS
BODY MASS INDEX: 27.85 KG/M2 | DIASTOLIC BLOOD PRESSURE: 80 MMHG | WEIGHT: 188 LBS | SYSTOLIC BLOOD PRESSURE: 135 MMHG | HEIGHT: 69 IN

## 2018-06-18 DIAGNOSIS — M54.2 NECK PAIN: Primary | ICD-10-CM

## 2018-06-18 DIAGNOSIS — G43.009 MIGRAINE WITHOUT AURA AND WITHOUT STATUS MIGRAINOSUS, NOT INTRACTABLE: ICD-10-CM

## 2018-06-18 PROCEDURE — 99213 OFFICE O/P EST LOW 20 MIN: CPT | Performed by: PSYCHIATRY & NEUROLOGY

## 2018-06-18 RX ORDER — TIZANIDINE 2 MG/1
4 TABLET ORAL NIGHTLY
Qty: 30 TABLET | Refills: 11 | Status: SHIPPED | OUTPATIENT
Start: 2018-06-18 | End: 2018-06-18 | Stop reason: SDUPTHER

## 2018-06-18 RX ORDER — SUMATRIPTAN AND NAPROXEN SODIUM 85; 500 MG/1; MG/1
1 TABLET, FILM COATED ORAL
Qty: 9 TABLET | Refills: 11 | Status: SHIPPED | OUTPATIENT
Start: 2018-06-18 | End: 2018-06-18 | Stop reason: SDUPTHER

## 2018-06-18 RX ORDER — SUMATRIPTAN AND NAPROXEN SODIUM 85; 500 MG/1; MG/1
1 TABLET, FILM COATED ORAL
Qty: 9 TABLET | Refills: 11 | Status: SHIPPED | OUTPATIENT
Start: 2018-06-18 | End: 2018-11-02 | Stop reason: SDUPTHER

## 2018-06-18 RX ORDER — TIZANIDINE 2 MG/1
4 TABLET ORAL NIGHTLY
Qty: 30 TABLET | Refills: 11 | Status: SHIPPED | OUTPATIENT
Start: 2018-06-18 | End: 2018-12-10

## 2018-06-18 NOTE — PROGRESS NOTES
Subjective:     Patient ID: Govind Manriquez is a 47 y.o. male.    History of Present Illness     The patient was seen in follow-up for headaches.  He has 2 different types of headache including migraine as well as muscle contraction headaches.  He was also seen for neck pain.  I referred him to neurosurgery last visit for this but did not have MRI of the spine is ordered.  Also he went to pain management but refused an epidural.  Continues have neck pain and muscle tension in his neck.  He was prescribed Topamax last time he was here but quit it after a week or so.  He is not quite sure why.  Treximet does work.  He is on Crestor.  The following portions of the patient's history were reviewed and updated as appropriate: allergies, current medications, past family history, past medical history, past social history, past surgical history and problem list.      Current Outpatient Prescriptions:   •  ketoconazole (NIZORAL) 2 % shampoo, Apply 1 application topically 2 (Two) Times a Week., Disp: 120 mL, Rfl: 1  •  rosuvastatin (CRESTOR) 20 MG tablet, Take 1 tablet by mouth Daily., Disp: 90 tablet, Rfl: 1  •  SUMAtriptan-naproxen (TREXIMET)  MG per tablet, Take 1 tablet by mouth Every 2 (Two) Hours As Needed for Migraine., Disp: 9 tablet, Rfl: 11  •  tiZANidine (ZANAFLEX) 2 MG tablet, Take 2 tablets by mouth Every Night., Disp: 30 tablet, Rfl: 11    Review of Systems   Constitutional: Negative.    Neurological: Positive for dizziness, numbness and headaches. Negative for tremors, seizures, syncope, facial asymmetry, speech difficulty, weakness and light-headedness.   Psychiatric/Behavioral: Negative.         Objective:    Neurologic Exam  Mental status examination was appropriate.  Funduscopy, visual fields, eye movements and pupillary reflexes were normal.  No facial weakness was noted.  Gait was normal.  No pattern of focal weakness was noted.  Physical Exam    Assessment/Plan:     Govind was seen today for  migraine.    Diagnoses and all orders for this visit:    Neck pain    Migraine without aura and without status migrainosus, not intractable    Other orders  -     Discontinue: SUMAtriptan-naproxen (TREXIMET)  MG per tablet; Take 1 tablet by mouth Every 2 (Two) Hours As Needed for Migraine.  -     Discontinue: tiZANidine (ZANAFLEX) 2 MG tablet; Take 2 tablets by mouth Every Night.  -     SUMAtriptan-naproxen (TREXIMET)  MG per tablet; Take 1 tablet by mouth Every 2 (Two) Hours As Needed for Migraine.  -     tiZANidine (ZANAFLEX) 2 MG tablet; Take 2 tablets by mouth Every Night.         Trial of tizanidine 2 mg at night.  Continue Treximet.  Call for follow-up in one month.  Follow-up in the office in 6 months.  Consider MRI the cervical spine if he is not better. Thank you for allowing me to share in the care of this patient.  Austin Anthony M.D.

## 2018-11-01 DIAGNOSIS — M54.2 NECK PAIN: Primary | ICD-10-CM

## 2018-11-02 ENCOUNTER — TELEPHONE (OUTPATIENT)
Dept: INTERNAL MEDICINE | Facility: CLINIC | Age: 48
End: 2018-11-02

## 2018-11-02 DIAGNOSIS — E78.5 HYPERLIPIDEMIA, UNSPECIFIED HYPERLIPIDEMIA TYPE: ICD-10-CM

## 2018-11-02 RX ORDER — SUMATRIPTAN AND NAPROXEN SODIUM 85; 500 MG/1; MG/1
1 TABLET, FILM COATED ORAL
Qty: 9 TABLET | Refills: 3 | Status: SHIPPED | OUTPATIENT
Start: 2018-11-02 | End: 2018-12-10 | Stop reason: SDUPTHER

## 2018-11-02 RX ORDER — ROSUVASTATIN CALCIUM 20 MG/1
20 TABLET, COATED ORAL DAILY
Qty: 90 TABLET | Refills: 1 | Status: SHIPPED | OUTPATIENT
Start: 2018-11-02 | End: 2019-05-24 | Stop reason: SDUPTHER

## 2018-11-02 NOTE — TELEPHONE ENCOUNTER
RX SENT TO PHARMACY    ----- Message from Zoya Shabazz sent at 11/2/2018  9:44 AM EDT -----  Regarding: MED REFILL  Patient needs a refill sent in to [facility] for his Crestor and Treximet. Any questions let him know. Thanks

## 2018-11-15 ENCOUNTER — APPOINTMENT (OUTPATIENT)
Dept: MRI IMAGING | Facility: HOSPITAL | Age: 48
End: 2018-11-15
Attending: NEUROLOGICAL SURGERY

## 2018-11-16 ENCOUNTER — HOSPITAL ENCOUNTER (OUTPATIENT)
Dept: MRI IMAGING | Facility: HOSPITAL | Age: 48
Discharge: HOME | End: 2018-11-16
Attending: NEUROLOGICAL SURGERY | Admitting: NEUROLOGICAL SURGERY

## 2018-11-16 DIAGNOSIS — M54.2 NECK PAIN: ICD-10-CM

## 2018-11-16 PROCEDURE — 72141 MRI NECK SPINE W/O DYE: CPT

## 2018-12-10 ENCOUNTER — OFFICE VISIT (OUTPATIENT)
Dept: NEUROLOGY | Facility: CLINIC | Age: 48
End: 2018-12-10

## 2018-12-10 VITALS
DIASTOLIC BLOOD PRESSURE: 80 MMHG | WEIGHT: 188 LBS | HEIGHT: 69 IN | HEART RATE: 84 BPM | BODY MASS INDEX: 27.85 KG/M2 | OXYGEN SATURATION: 96 % | SYSTOLIC BLOOD PRESSURE: 135 MMHG

## 2018-12-10 DIAGNOSIS — M54.2 NECK PAIN: Primary | ICD-10-CM

## 2018-12-10 DIAGNOSIS — G43.009 MIGRAINE WITHOUT AURA AND WITHOUT STATUS MIGRAINOSUS, NOT INTRACTABLE: ICD-10-CM

## 2018-12-10 DIAGNOSIS — G44.209 TENSION HEADACHE: ICD-10-CM

## 2018-12-10 PROCEDURE — 99214 OFFICE O/P EST MOD 30 MIN: CPT | Performed by: PSYCHIATRY & NEUROLOGY

## 2018-12-10 RX ORDER — SUMATRIPTAN AND NAPROXEN SODIUM 85; 500 MG/1; MG/1
1 TABLET, FILM COATED ORAL
Qty: 9 TABLET | Refills: 11 | Status: SHIPPED | OUTPATIENT
Start: 2018-12-10 | End: 2019-05-24 | Stop reason: SDUPTHER

## 2018-12-10 RX ORDER — TOPIRAMATE 25 MG/1
25 TABLET ORAL NIGHTLY
Qty: 30 TABLET | Refills: 11 | Status: SHIPPED | OUTPATIENT
Start: 2018-12-10 | End: 2019-05-24 | Stop reason: SDUPTHER

## 2018-12-10 NOTE — PROGRESS NOTES
Subjective:     Patient ID: Govind Manriquez is a 47 y.o. male.    History of Present Illness  The patient was seen back in the office for follow-up of migraine, muscle contraction headaches, and neck pain.  He is being treated and evaluated by Dr. Banegas for neck pain.  He does get headaches most days but he has a lot of headaches that started in his neck and will go up into his right eye.  He takes Treximet which does work for this.  2 times ago when I saw him I placed him on Topamax he took this only for a few days.  When I saw him last in June I prescribed tizanidine which she took for about 2 weeks and then stopped it.    The following portions of the patient's history were reviewed and updated as appropriate: allergies, current medications, past family history, past medical history, past social history, past surgical history and problem list.      Current Outpatient Medications:   •  ketoconazole (NIZORAL) 2 % shampoo, Apply 1 application topically 2 (Two) Times a Week., Disp: 120 mL, Rfl: 1  •  rosuvastatin (CRESTOR) 20 MG tablet, Take 1 tablet by mouth Daily., Disp: 90 tablet, Rfl: 1  •  SUMAtriptan-naproxen (TREXIMET)  MG per tablet, Take 1 tablet by mouth Every 2 (Two) Hours As Needed for Migraine., Disp: 9 tablet, Rfl: 11  •  topiramate (TOPAMAX) 25 MG tablet, Take 1 tablet by mouth Every Night., Disp: 30 tablet, Rfl: 11    Review of Systems   Constitutional: Negative.    Neurological: Positive for dizziness and headaches. Negative for tremors, seizures, syncope, facial asymmetry, speech difficulty, weakness, light-headedness and numbness.   Psychiatric/Behavioral: Negative.         Objective:    Neurologic Exam  Mental status was appropriate for age.  Fundoscopy showed no papilledema. Visual fields were full to OKN.  Eye movements were full and conjugate.  Pupillary reflexes were mid-range and symmetric.  No facial weakness was noted.  Tongue was midline.  There was no pattern of focal weakness.   Gait was appropriate for age.  No pathologic reflexes were noted.  No cerebellar signs were noted.  Tone was normal.  Deep tendon reflexes were 2+ and symmetric.  Physical Exam    Assessment/Plan:     Govind was seen today for migraine.    Diagnoses and all orders for this visit:    Neck pain    Migraine without aura and without status migrainosus, not intractable    Tension headache    Other orders  -     SUMAtriptan-naproxen (TREXIMET)  MG per tablet; Take 1 tablet by mouth Every 2 (Two) Hours As Needed for Migraine.  -     topiramate (TOPAMAX) 25 MG tablet; Take 1 tablet by mouth Every Night.         I have talked with him at length about the importance of taking medicine persistently.  The patient is a nurse on the neurology floor.  I've started him back on topiramate 25 mg at night.  I plan to see him back the office in 3 months.  I have refilled Treximet as well.  Tizanidine is still not working. Thank you for allowing me to share in the care of this patient.  Austin Anthony M.D.

## 2018-12-31 ENCOUNTER — HOSPITAL ENCOUNTER (EMERGENCY)
Facility: HOSPITAL | Age: 48
Discharge: HOME | End: 2018-12-31
Attending: EMERGENCY MEDICINE | Admitting: EMERGENCY MEDICINE

## 2018-12-31 VITALS
WEIGHT: 192.5 LBS | SYSTOLIC BLOOD PRESSURE: 135 MMHG | BODY MASS INDEX: 28.51 KG/M2 | OXYGEN SATURATION: 100 % | DIASTOLIC BLOOD PRESSURE: 94 MMHG | TEMPERATURE: 96.5 F | RESPIRATION RATE: 16 BRPM | HEART RATE: 85 BPM | HEIGHT: 69 IN

## 2018-12-31 DIAGNOSIS — G44.209 ACUTE NON INTRACTABLE TENSION-TYPE HEADACHE: Primary | ICD-10-CM

## 2018-12-31 PROCEDURE — 96374 THER/PROPH/DIAG INJ IV PUSH: CPT

## 2018-12-31 PROCEDURE — 25010000002 KETOROLAC TROMETHAMINE PER 15 MG: Performed by: EMERGENCY MEDICINE

## 2018-12-31 PROCEDURE — 96375 TX/PRO/DX INJ NEW DRUG ADDON: CPT

## 2018-12-31 PROCEDURE — 25010000002 DIPHENHYDRAMINE PER 50 MG: Performed by: EMERGENCY MEDICINE

## 2018-12-31 PROCEDURE — 25010000002 PROCHLORPERAZINE EDISYLATE PER 10 MG: Performed by: EMERGENCY MEDICINE

## 2018-12-31 PROCEDURE — 99283 EMERGENCY DEPT VISIT LOW MDM: CPT

## 2018-12-31 RX ORDER — DIPHENHYDRAMINE HYDROCHLORIDE 50 MG/ML
25 INJECTION INTRAMUSCULAR; INTRAVENOUS ONCE
Status: COMPLETED | OUTPATIENT
Start: 2018-12-31 | End: 2018-12-31

## 2018-12-31 RX ORDER — KETOROLAC TROMETHAMINE 30 MG/ML
30 INJECTION, SOLUTION INTRAMUSCULAR; INTRAVENOUS ONCE
Status: COMPLETED | OUTPATIENT
Start: 2018-12-31 | End: 2018-12-31

## 2018-12-31 RX ADMIN — PROCHLORPERAZINE EDISYLATE 10 MG: 5 INJECTION INTRAMUSCULAR; INTRAVENOUS at 04:41

## 2018-12-31 RX ADMIN — DIPHENHYDRAMINE HYDROCHLORIDE 25 MG: 50 INJECTION, SOLUTION INTRAMUSCULAR; INTRAVENOUS at 04:39

## 2018-12-31 RX ADMIN — KETOROLAC TROMETHAMINE 30 MG: 30 INJECTION, SOLUTION INTRAMUSCULAR at 04:43

## 2019-01-08 ENCOUNTER — OFFICE VISIT (OUTPATIENT)
Dept: INTERNAL MEDICINE | Facility: CLINIC | Age: 49
End: 2019-01-08

## 2019-01-08 VITALS
WEIGHT: 187.6 LBS | SYSTOLIC BLOOD PRESSURE: 118 MMHG | BODY MASS INDEX: 27.78 KG/M2 | TEMPERATURE: 98.3 F | HEART RATE: 76 BPM | HEIGHT: 69 IN | OXYGEN SATURATION: 98 % | DIASTOLIC BLOOD PRESSURE: 72 MMHG

## 2019-01-08 DIAGNOSIS — G43.009 MIGRAINE WITHOUT AURA AND WITHOUT STATUS MIGRAINOSUS, NOT INTRACTABLE: ICD-10-CM

## 2019-01-08 DIAGNOSIS — R10.9 RIGHT SIDED ABDOMINAL PAIN: Primary | ICD-10-CM

## 2019-01-08 PROBLEM — G43.909 MIGRAINE: Status: ACTIVE | Noted: 2018-02-22

## 2019-01-08 PROBLEM — G43.909 MIGRAINE: Status: RESOLVED | Noted: 2018-02-22 | Resolved: 2019-01-08

## 2019-01-08 PROCEDURE — 99214 OFFICE O/P EST MOD 30 MIN: CPT | Performed by: INTERNAL MEDICINE

## 2019-01-08 NOTE — PROGRESS NOTES
Subjective   Govind Manriquez is a 48 y.o. male c/o HA, vomiting, nausea pain on rt upper quadrant that radiates to his back.      History of Present Illness   He has a constant dull ache on the right side  No relation to food.  It is always there.    He does have chronic HA abd sees neuro for this  He has been starting to have nausea and emesis related to HA he is on some meds for this top    The following portions of the patient's history were reviewed and updated as appropriate: allergies, current medications, past medical history, past social history and problem list.  He de nies any change in diet or hydration    Review of Systems   Gastrointestinal: Positive for abdominal pain and nausea. Negative for abdominal distention, anal bleeding, blood in stool, constipation, diarrhea and rectal pain.   All other systems reviewed and are negative.      Objective   Physical Exam   Constitutional: He is oriented to person, place, and time. He appears well-developed and well-nourished.   HENT:   Head: Normocephalic and atraumatic.   Right Ear: External ear normal.   Left Ear: External ear normal.   Mouth/Throat: Oropharynx is clear and moist.   Eyes: Conjunctivae and EOM are normal. Pupils are equal, round, and reactive to light.   Neck: Normal range of motion. No tracheal deviation present. No thyromegaly present.   Cardiovascular: Normal rate, regular rhythm, normal heart sounds and intact distal pulses.   Pulmonary/Chest: Effort normal and breath sounds normal.   Abdominal: Soft. Bowel sounds are normal. He exhibits no distension. There is no tenderness.   Musculoskeletal: Normal range of motion. He exhibits no edema or deformity.   Neurological: He is alert and oriented to person, place, and time.   Skin: Skin is warm and dry.   Psychiatric: He has a normal mood and affect. His behavior is normal. Judgment and thought content normal.   Vitals reviewed.      Vitals:    01/08/19 1453   BP: 118/72   Pulse: 76   Temp:  98.3 °F (36.8 °C)   SpO2: 98%     Current Outpatient Medications:   •  rosuvastatin (CRESTOR) 20 MG tablet, Take 1 tablet by mouth Daily., Disp: 90 tablet, Rfl: 1  •  SUMAtriptan-naproxen (TREXIMET)  MG per tablet, Take 1 tablet by mouth Every 2 (Two) Hours As Needed for Migraine., Disp: 9 tablet, Rfl: 11  •  topiramate (TOPAMAX) 25 MG tablet, Take 1 tablet by mouth Every Night., Disp: 30 tablet, Rfl: 11  •  ketoconazole (NIZORAL) 2 % shampoo, Apply 1 application topically 2 (Two) Times a Week., Disp: 120 mL, Rfl: 1         Assessment/Plan   Diagnoses and all orders for this visit:    Right sided abdominal pain  -     Comprehensive Metabolic Panel  -     Amylase  -     Lipase  -     US Gallbladder    Migraine without aura and without status migrainosus, not intractable    1.  Right sided abd pain-  Sx are a little atypical for gallbladder but we will go ahead and check the US   2.  HA- related to chronic neck pain  Now with more nausea a/w this.  He has a fu with NS and neurology  HE may benefit from the new injection meds

## 2019-01-09 ENCOUNTER — HOSPITAL ENCOUNTER (OUTPATIENT)
Dept: ULTRASOUND IMAGING | Facility: HOSPITAL | Age: 49
Discharge: HOME | End: 2019-01-09
Admitting: INTERNAL MEDICINE

## 2019-01-09 LAB
ALBUMIN SERPL-MCNC: 4.9 G/DL (ref 3.5–5.2)
ALBUMIN/GLOB SERPL: 1.6 G/DL
ALP SERPL-CCNC: 67 U/L (ref 39–117)
ALT SERPL-CCNC: 25 U/L (ref 1–41)
AMYLASE SERPL-CCNC: 58 U/L (ref 28–100)
AST SERPL-CCNC: 22 U/L (ref 1–40)
BILIRUB SERPL-MCNC: 0.2 MG/DL (ref 0.1–1.2)
BUN SERPL-MCNC: 17 MG/DL (ref 6–20)
BUN/CREAT SERPL: 15.9 (ref 7–25)
CALCIUM SERPL-MCNC: 10.6 MG/DL (ref 8.6–10.5)
CHLORIDE SERPL-SCNC: 103 MMOL/L (ref 98–107)
CO2 SERPL-SCNC: 26.6 MMOL/L (ref 22–29)
CREAT SERPL-MCNC: 1.07 MG/DL (ref 0.76–1.27)
GLOBULIN SER CALC-MCNC: 3 GM/DL
GLUCOSE SERPL-MCNC: 97 MG/DL (ref 65–99)
LIPASE SERPL-CCNC: 23 U/L (ref 13–60)
POTASSIUM SERPL-SCNC: 4.4 MMOL/L (ref 3.5–5.2)
PROT SERPL-MCNC: 7.9 G/DL (ref 6–8.5)
SODIUM SERPL-SCNC: 143 MMOL/L (ref 136–145)

## 2019-01-09 PROCEDURE — 76705 ECHO EXAM OF ABDOMEN: CPT

## 2019-02-01 NOTE — PROGRESS NOTES
Subjective   Patient ID: Govind Manriquez is a 48 y.o. male is here today for follow-up with a new Cervical MRI that was ordered for neck pain with headaches. He also has back pain.    History of Present Illness    This patient has been having increasing trouble with neck pain as well as pain in his thoracic and his lumbar spine.  He feels that over time his pain has gotten worse and becomes less tolerable.  He does not have a lot of radiating pain.  His been tried on various medications but nothing really helps and he really doesn't like to take medications.    The following portions of the patient's history were reviewed and updated as appropriate: allergies, current medications, past family history, past medical history, past social history, past surgical history and problem list.    Review of Systems   Respiratory: Negative for chest tightness and shortness of breath.    Cardiovascular: Negative for chest pain.   Musculoskeletal: Positive for back pain and neck pain.   Neurological: Positive for headaches.        Tingling in upper extremity's   All other systems reviewed and are negative.      Objective   Physical Exam   Constitutional: He is oriented to person, place, and time. He appears well-developed and well-nourished.   Eyes: EOM are normal. Pupils are equal, round, and reactive to light.   Neurological: He is oriented to person, place, and time. He has a normal Finger-Nose-Finger Test and a normal Heel to Shin Test. Gait normal.   Reflex Scores:       Tricep reflexes are 2+ on the right side and 2+ on the left side.       Bicep reflexes are 2+ on the right side and 2+ on the left side.       Brachioradialis reflexes are 2+ on the right side and 2+ on the left side.       Patellar reflexes are 2+ on the right side and 2+ on the left side.       Achilles reflexes are 2+ on the right side and 2+ on the left side.  Psychiatric: His speech is normal.     Neurologic Exam     Mental Status   Oriented to person,  place, and time.   Registration of memory: Good recent and remote memory.   Attention: normal. Concentration: normal.   Speech: speech is normal   Level of consciousness: alert  Knowledge: consistent with education.     Cranial Nerves     CN II   Visual fields full to confrontation.   Visual acuity: normal    CN III, IV, VI   Pupils are equal, round, and reactive to light.  Extraocular motions are normal.     CN V   Facial sensation intact.   Right corneal reflex: normal  Left corneal reflex: normal    CN VII   Facial expression full, symmetric.   Right facial weakness: none  Left facial weakness: none    CN VIII   Hearing: intact    CN IX, X   Palate: symmetric    CN XI   Right sternocleidomastoid strength: normal  Left sternocleidomastoid strength: normal    CN XII   Tongue: not atrophic  Tongue deviation: none    Motor Exam   Muscle bulk: normal  Right arm tone: normal  Left arm tone: normal  Right leg tone: normal  Left leg tone: normal    Strength   Strength 5/5 except as noted.     Sensory Exam   Light touch normal.     Gait, Coordination, and Reflexes     Gait  Gait: normal    Coordination   Finger to nose coordination: normal  Heel to shin coordination: normal    Reflexes   Right brachioradialis: 2+  Left brachioradialis: 2+  Right biceps: 2+  Left biceps: 2+  Right triceps: 2+  Left triceps: 2+  Right patellar: 2+  Left patellar: 2+  Right achilles: 2+  Left achilles: 2+  Right : 2+  Left : 2+      Assessment/Plan   Independent Review of Radiographic Studies:      I reviewed an MRI of his cervical spine done in November of last year.  This shows a widely patent canal and neural foramina at C2 3.  There is some foraminal stenosis at C3 4 but not severe.  C4 5 also shows some foraminal stenosis but not severe.  C5 6 shows some left-sided foraminal stenosis but very mild and C6 7 as well as C7-T1 are widely open area    Medical Decision Making:      I told the patient about the MRI.  I told him that  the foraminal stenosis at C3 4 and C4 5 could be contributing to his pain syndrome but I don't think it is a clear enough association so as to warrant surgery.  I do think we need to look at his thoracic and his lumbar spine as those have not been imaged anywhere on the system that I can tell.  I will call him with the results.    Govind was seen today for neck pain and back pain.    Diagnoses and all orders for this visit:    Chronic bilateral thoracic back pain  -     MRI Thoracic Spine Without Contrast; Future    Chronic bilateral low back pain without sciatica  -     MRI Lumbar Spine Without Contrast; Future      Return for Call with results.

## 2019-02-05 ENCOUNTER — OFFICE VISIT (OUTPATIENT)
Dept: NEUROSURGERY | Facility: CLINIC | Age: 49
End: 2019-02-05

## 2019-02-05 VITALS — SYSTOLIC BLOOD PRESSURE: 135 MMHG | DIASTOLIC BLOOD PRESSURE: 89 MMHG | HEART RATE: 96 BPM

## 2019-02-05 DIAGNOSIS — M54.50 CHRONIC BILATERAL LOW BACK PAIN WITHOUT SCIATICA: ICD-10-CM

## 2019-02-05 DIAGNOSIS — M54.6 CHRONIC BILATERAL THORACIC BACK PAIN: Primary | ICD-10-CM

## 2019-02-05 DIAGNOSIS — G89.29 CHRONIC BILATERAL THORACIC BACK PAIN: Primary | ICD-10-CM

## 2019-02-05 DIAGNOSIS — G89.29 CHRONIC BILATERAL LOW BACK PAIN WITHOUT SCIATICA: ICD-10-CM

## 2019-02-05 PROCEDURE — 99213 OFFICE O/P EST LOW 20 MIN: CPT | Performed by: NEUROLOGICAL SURGERY

## 2019-03-12 ENCOUNTER — TELEPHONE (OUTPATIENT)
Dept: NEUROSURGERY | Facility: CLINIC | Age: 49
End: 2019-03-12

## 2019-03-22 ENCOUNTER — HOSPITAL ENCOUNTER (OUTPATIENT)
Dept: MRI IMAGING | Facility: HOSPITAL | Age: 49
Discharge: HOME | End: 2019-03-22
Admitting: NEUROLOGICAL SURGERY

## 2019-03-22 ENCOUNTER — HOSPITAL ENCOUNTER (OUTPATIENT)
Dept: MRI IMAGING | Facility: HOSPITAL | Age: 49
Discharge: HOME | End: 2019-03-22

## 2019-03-22 DIAGNOSIS — G89.29 CHRONIC BILATERAL THORACIC BACK PAIN: ICD-10-CM

## 2019-03-22 DIAGNOSIS — M54.6 CHRONIC BILATERAL THORACIC BACK PAIN: ICD-10-CM

## 2019-03-22 DIAGNOSIS — M54.50 CHRONIC BILATERAL LOW BACK PAIN WITHOUT SCIATICA: ICD-10-CM

## 2019-03-22 DIAGNOSIS — G89.29 CHRONIC BILATERAL LOW BACK PAIN WITHOUT SCIATICA: ICD-10-CM

## 2019-03-22 PROCEDURE — 72146 MRI CHEST SPINE W/O DYE: CPT

## 2019-03-22 PROCEDURE — 72148 MRI LUMBAR SPINE W/O DYE: CPT

## 2019-03-25 ENCOUNTER — TELEPHONE (OUTPATIENT)
Dept: NEUROSURGERY | Facility: CLINIC | Age: 49
End: 2019-03-25

## 2019-03-25 DIAGNOSIS — G89.29 CHRONIC BILATERAL THORACIC BACK PAIN: Primary | ICD-10-CM

## 2019-03-25 DIAGNOSIS — M54.6 CHRONIC BILATERAL THORACIC BACK PAIN: Primary | ICD-10-CM

## 2019-03-25 NOTE — TELEPHONE ENCOUNTER
Dr. Banegas will call him with the results of his Bone scan. Order sent to scheduling to get that scheduled.

## 2019-03-25 NOTE — TELEPHONE ENCOUNTER
Patient called given Dr Banegas response, ok with bone scan. He wanted Dr Banegas to know he had his first Cervical Epidural last week. [phone]

## 2019-03-25 NOTE — TELEPHONE ENCOUNTER
I called this patient today to tell him that his MRI of the thoracic and the lumbar spine look okay.  There is an abnormality in the T10 vertebral body which was not present on an MRI in 2007 and the radiologist recommended a bone scan.  I would recommend that we go ahead and send him for that and call him with the results.  I do not think that is contributing to his pain.  I think most of his pain is coming from arthritis in the joints in his spine as well as some element of soft tissue inflammation.  I think that his best bet is to stay conservative at this point but if he feels the pain is out of control then I think we could consider getting him into see a pain management doctor.  I had to leave a voicemail for him to call back.  Please let him know when he calls and we can call him with the results of the bone scan.

## 2019-04-12 ENCOUNTER — APPOINTMENT (OUTPATIENT)
Dept: ULTRASOUND IMAGING | Facility: HOSPITAL | Age: 49
End: 2019-04-12

## 2019-04-16 ENCOUNTER — HOSPITAL ENCOUNTER (OUTPATIENT)
Dept: NUCLEAR MEDICINE | Facility: HOSPITAL | Age: 49
Discharge: HOME | End: 2019-04-16

## 2019-04-16 DIAGNOSIS — G89.29 CHRONIC BILATERAL THORACIC BACK PAIN: ICD-10-CM

## 2019-04-16 DIAGNOSIS — M54.6 CHRONIC BILATERAL THORACIC BACK PAIN: ICD-10-CM

## 2019-04-16 PROCEDURE — 0 TECHNETIUM MEDRONATE KIT: Performed by: NEUROLOGICAL SURGERY

## 2019-04-16 PROCEDURE — 78306 BONE IMAGING WHOLE BODY: CPT

## 2019-04-16 PROCEDURE — A9503 TC99M MEDRONATE: HCPCS | Performed by: NEUROLOGICAL SURGERY

## 2019-04-16 RX ORDER — TC 99M MEDRONATE 20 MG/10ML
24 INJECTION, POWDER, LYOPHILIZED, FOR SOLUTION INTRAVENOUS
Status: COMPLETED | OUTPATIENT
Start: 2019-04-16 | End: 2019-04-16

## 2019-04-16 RX ADMIN — Medication 24 MILLICURIE: at 07:38

## 2019-04-17 ENCOUNTER — TELEPHONE (OUTPATIENT)
Dept: NEUROSURGERY | Facility: CLINIC | Age: 49
End: 2019-04-17

## 2019-04-17 NOTE — TELEPHONE ENCOUNTER
Gilmar Banegas MD Mattingly, Kara, MA             Please let this patient know that his bone scan does not show any evidence of tumor.  I do not think we need to do anything else about the abnormality in his thoracic vertebral body.  As explained to him in the office I think his pain is coming from arthritis in his spine and I would manage this with nonsurgical treatment.  If the pain becomes completely unbearable we can certainly get him into see a pain management doctor.      Patient is already seeing a pain mgmt doctor for his Cervical Spine. He will call the office with any further problems or concerns.

## 2019-05-10 ENCOUNTER — RESULTS ENCOUNTER (OUTPATIENT)
Dept: INTERNAL MEDICINE | Facility: CLINIC | Age: 49
End: 2019-05-10

## 2019-05-10 DIAGNOSIS — Z00.00 HEALTH CARE MAINTENANCE: ICD-10-CM

## 2019-05-10 DIAGNOSIS — E78.5 HYPERLIPIDEMIA, UNSPECIFIED HYPERLIPIDEMIA TYPE: ICD-10-CM

## 2019-05-11 LAB
ALBUMIN SERPL-MCNC: 4.7 G/DL (ref 3.5–5.2)
ALBUMIN/GLOB SERPL: 2 G/DL
ALP SERPL-CCNC: 59 U/L (ref 39–117)
ALT SERPL-CCNC: 26 U/L (ref 1–41)
AST SERPL-CCNC: 23 U/L (ref 1–40)
BASOPHILS # BLD AUTO: 0.11 10*3/MM3 (ref 0–0.2)
BASOPHILS NFR BLD AUTO: 1.5 % (ref 0–1.5)
BILIRUB SERPL-MCNC: 0.3 MG/DL (ref 0.2–1.2)
BUN SERPL-MCNC: 15 MG/DL (ref 6–20)
BUN/CREAT SERPL: 16.3 (ref 7–25)
CALCIUM SERPL-MCNC: 10.3 MG/DL (ref 8.6–10.5)
CHLORIDE SERPL-SCNC: 102 MMOL/L (ref 98–107)
CHOLEST SERPL-MCNC: 276 MG/DL (ref 0–200)
CO2 SERPL-SCNC: 26.8 MMOL/L (ref 22–29)
CREAT SERPL-MCNC: 0.92 MG/DL (ref 0.76–1.27)
EOSINOPHIL # BLD AUTO: 0.26 10*3/MM3 (ref 0–0.4)
EOSINOPHIL NFR BLD AUTO: 3.4 % (ref 0.3–6.2)
ERYTHROCYTE [DISTWIDTH] IN BLOOD BY AUTOMATED COUNT: 14.1 % (ref 12.3–15.4)
GLOBULIN SER CALC-MCNC: 2.4 GM/DL
GLUCOSE SERPL-MCNC: 103 MG/DL (ref 65–99)
HCT VFR BLD AUTO: 47.3 % (ref 37.5–51)
HDLC SERPL-MCNC: 50 MG/DL (ref 40–60)
HGB BLD-MCNC: 14.6 G/DL (ref 13–17.7)
IMM GRANULOCYTES # BLD AUTO: 0.02 10*3/MM3 (ref 0–0.05)
IMM GRANULOCYTES NFR BLD AUTO: 0.3 % (ref 0–0.5)
LDLC SERPL CALC-MCNC: 196 MG/DL (ref 0–100)
LDLC/HDLC SERPL: 3.92 {RATIO}
LYMPHOCYTES # BLD AUTO: 2.45 10*3/MM3 (ref 0.7–3.1)
LYMPHOCYTES NFR BLD AUTO: 32.3 % (ref 19.6–45.3)
MCH RBC QN AUTO: 30.4 PG (ref 26.6–33)
MCHC RBC AUTO-ENTMCNC: 30.9 G/DL (ref 31.5–35.7)
MCV RBC AUTO: 98.3 FL (ref 79–97)
MONOCYTES # BLD AUTO: 0.78 10*3/MM3 (ref 0.1–0.9)
MONOCYTES NFR BLD AUTO: 10.3 % (ref 5–12)
NEUTROPHILS # BLD AUTO: 3.96 10*3/MM3 (ref 1.7–7)
NEUTROPHILS NFR BLD AUTO: 52.2 % (ref 42.7–76)
NRBC BLD AUTO-RTO: 0.1 /100 WBC (ref 0–0.2)
PLATELET # BLD AUTO: 251 10*3/MM3 (ref 140–450)
POTASSIUM SERPL-SCNC: 4.9 MMOL/L (ref 3.5–5.2)
PROT SERPL-MCNC: 7.1 G/DL (ref 6–8.5)
RBC # BLD AUTO: 4.81 10*6/MM3 (ref 4.14–5.8)
SODIUM SERPL-SCNC: 141 MMOL/L (ref 136–145)
TRIGL SERPL-MCNC: 151 MG/DL (ref 0–150)
TSH SERPL DL<=0.005 MIU/L-ACNC: 1.36 MIU/ML (ref 0.27–4.2)
VLDLC SERPL CALC-MCNC: 30.2 MG/DL
WBC # BLD AUTO: 7.58 10*3/MM3 (ref 3.4–10.8)

## 2019-05-13 ENCOUNTER — OFFICE VISIT (OUTPATIENT)
Dept: INTERNAL MEDICINE | Facility: CLINIC | Age: 49
End: 2019-05-13

## 2019-05-13 VITALS
OXYGEN SATURATION: 97 % | WEIGHT: 189.8 LBS | DIASTOLIC BLOOD PRESSURE: 84 MMHG | HEIGHT: 69 IN | HEART RATE: 76 BPM | TEMPERATURE: 98.7 F | SYSTOLIC BLOOD PRESSURE: 122 MMHG | BODY MASS INDEX: 28.11 KG/M2

## 2019-05-13 DIAGNOSIS — G43.009 MIGRAINE WITHOUT AURA AND WITHOUT STATUS MIGRAINOSUS, NOT INTRACTABLE: ICD-10-CM

## 2019-05-13 DIAGNOSIS — Z00.00 HEALTH CARE MAINTENANCE: Primary | ICD-10-CM

## 2019-05-13 DIAGNOSIS — E78.5 HYPERLIPIDEMIA, UNSPECIFIED HYPERLIPIDEMIA TYPE: ICD-10-CM

## 2019-05-13 PROCEDURE — 99396 PREV VISIT EST AGE 40-64: CPT | Performed by: INTERNAL MEDICINE

## 2019-05-13 NOTE — PROGRESS NOTES
"Subjective   Govind Manriquez is a 48 y.o. male and is here for a comprehensive physical exam. The patient reports problems - hpl.  He is taking the crestor prn as he doesn't like taking meds  He is cont ot have HA reg and does well with prn treximet  He could not tolerate elavil or topamax  He did cut his neck on a metal bench over the weekend.       Do you take any herbs or supplements that were not prescribed by a doctor?       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    Objective   /84 (BP Location: Left arm, Patient Position: Sitting, Cuff Size: Adult)   Pulse 76   Temp 98.7 °F (37.1 °C) (Oral)   Ht 175.3 cm (69\")   Wt 86.1 kg (189 lb 12.8 oz)   SpO2 97%   BMI 28.03 kg/m²     General Appearance:    Alert, cooperative, no distress, appears stated age   Head:    Normocephalic, without obvious abnormality, atraumatic   Eyes:    PERRL, conjunctiva/corneas clear, EOM's intact, fundi     benign, both eyes        Ears:    Normal TM's and external ear canals, both ears   Nose:   Nares normal, septum midline, mucosa normal, no drainage    or sinus tenderness   Throat:   Lips, mucosa, and tongue normal; teeth and gums normal   Neck:   Supple, symmetrical, " trachea midline, no adenopathy;        thyroid:  No enlargement/tenderness/nodules; no carotid    bruit or JVD   Back:     Symmetric, no curvature, ROM normal, no CVA tenderness   Lungs:     Clear to auscultation bilaterally, respirations unlabored   Chest wall:    No tenderness or deformity   Heart:    Regular rate and rhythm, S1 and S2 normal, no murmur, rub   or gallop   Abdomen:     Soft, non-tender, bowel sounds active all four quadrants,     no masses, no organomegaly           Extremities:   Extremities normal, atraumatic, no cyanosis or edema   Pulses:   2+ and symmetric all extremities   Skin:   Skin color, texture, turgor normal, no rashes or lesions   Lymph nodes:   Cervical, supraclavicular, and axillary nodes normal   Neurologic:   CNII-XII intact. Normal strength, sensation and reflexes       throughout       Medications:   Current Outpatient Medications:   •  rosuvastatin (CRESTOR) 20 MG tablet, Take 1 tablet by mouth Daily., Disp: 90 tablet, Rfl: 1  •  SUMAtriptan-naproxen (TREXIMET)  MG per tablet, Take 1 tablet by mouth Every 2 (Two) Hours As Needed for Migraine., Disp: 9 tablet, Rfl: 11  •  ketoconazole (NIZORAL) 2 % shampoo, Apply 1 application topically 2 (Two) Times a Week., Disp: 120 mL, Rfl: 1  •  topiramate (TOPAMAX) 25 MG tablet, Take 1 tablet by mouth Every Night., Disp: 30 tablet, Rfl: 11       Assessment/Plan   Healthy male exam.      1. Healthcare Maintenance:  2. Patient Counseling:  --Nutrition: Stressed importance of moderation in sodium/caffeine intake, saturated fat and cholesterol, caloric balance, sufficient intake of fresh fruits, vegetables, fiber, calcium and vit D  --Exercise: he does exercise reg  --Substance Abuse: occas etoh no tob  --Dental health: he does go to the dentist reg  --Immunizations reviewed. I have rec tdap  --Discussed benefits of screening colonoscopy.  3.  HA-  I have rec talking to neuro about [employer]  4. HPL- he really needs the statin  He is going  to start this

## 2019-05-24 DIAGNOSIS — E78.5 HYPERLIPIDEMIA, UNSPECIFIED HYPERLIPIDEMIA TYPE: ICD-10-CM

## 2019-05-24 RX ORDER — ROSUVASTATIN CALCIUM 20 MG/1
20 TABLET, COATED ORAL DAILY
Qty: 90 TABLET | Refills: 1 | Status: SHIPPED | OUTPATIENT
Start: 2019-05-24 | End: 2020-06-01

## 2019-05-28 RX ORDER — SUMATRIPTAN AND NAPROXEN SODIUM 85; 500 MG/1; MG/1
1 TABLET, FILM COATED ORAL
Qty: 9 TABLET | Refills: 6 | Status: SHIPPED | OUTPATIENT
Start: 2019-05-28 | End: 2019-12-13 | Stop reason: SDUPTHER

## 2019-05-28 RX ORDER — TOPIRAMATE 25 MG/1
25 TABLET ORAL NIGHTLY
Qty: 30 TABLET | Refills: 6 | Status: SHIPPED | OUTPATIENT
Start: 2019-05-28 | End: 2020-02-18 | Stop reason: SDUPTHER

## 2019-12-13 RX ORDER — SUMATRIPTAN SUCC/NAPROXEN SOD 85MG-500MG
TABLET ORAL
Qty: 9 TABLET | Refills: 0 | Status: SHIPPED | OUTPATIENT
Start: 2019-12-13 | End: 2020-01-21 | Stop reason: SDUPTHER

## 2020-01-20 RX ORDER — SUMATRIPTAN SUCC/NAPROXEN SOD 85MG-500MG
TABLET ORAL
Qty: 9 TABLET | Refills: 0 | OUTPATIENT
Start: 2020-01-20

## 2020-01-21 RX ORDER — SUMATRIPTAN AND NAPROXEN SODIUM 85; 500 MG/1; MG/1
1 TABLET, FILM COATED ORAL
Qty: 9 TABLET | Refills: 0 | Status: SHIPPED | OUTPATIENT
Start: 2020-01-21 | End: 2020-01-22 | Stop reason: SDUPTHER

## 2020-01-22 ENCOUNTER — TELEPHONE (OUTPATIENT)
Dept: NEUROLOGY | Facility: CLINIC | Age: 50
End: 2020-01-22

## 2020-01-22 RX ORDER — SUMATRIPTAN SUCC/NAPROXEN SOD 85MG-500MG
1 TABLET ORAL
Qty: 9 TABLET | Refills: 0 | Status: SHIPPED | OUTPATIENT
Start: 2020-01-22 | End: 2020-02-18 | Stop reason: SDUPTHER

## 2020-01-22 NOTE — TELEPHONE ENCOUNTER
Called pt and let him know RX was sent to [facility]. Pt stated he was not using that pharmacy any more.  Rx was sent to [facility].

## 2020-01-22 NOTE — TELEPHONE ENCOUNTER
----- Message from Janae Romero MA sent at 1/22/2020 10:35 AM EST -----  Contact: pt  Pt scheduled appt with naomie, but needs a refill for his treximet   prn.  Sts he will be out before his appt.  Send to [facility] on [address]       Thank you

## 2020-02-18 ENCOUNTER — OFFICE VISIT (OUTPATIENT)
Dept: NEUROLOGY | Facility: CLINIC | Age: 50
End: 2020-02-18

## 2020-02-18 VITALS — HEIGHT: 69 IN | WEIGHT: 188 LBS | BODY MASS INDEX: 27.85 KG/M2

## 2020-02-18 DIAGNOSIS — G43.019 INTRACTABLE MIGRAINE WITHOUT AURA AND WITHOUT STATUS MIGRAINOSUS: Primary | ICD-10-CM

## 2020-02-18 DIAGNOSIS — G44.209 TENSION HEADACHE: ICD-10-CM

## 2020-02-18 DIAGNOSIS — M54.2 NECK PAIN: ICD-10-CM

## 2020-02-18 PROCEDURE — 99213 OFFICE O/P EST LOW 20 MIN: CPT | Performed by: PSYCHIATRY & NEUROLOGY

## 2020-02-18 RX ORDER — SUMATRIPTAN SUCC/NAPROXEN SOD 85MG-500MG
1 TABLET ORAL
Qty: 9 TABLET | Refills: 11 | Status: SHIPPED | OUTPATIENT
Start: 2020-02-18 | End: 2020-05-14 | Stop reason: SDUPTHER

## 2020-02-18 RX ORDER — TOPIRAMATE 25 MG/1
25 TABLET ORAL NIGHTLY
Qty: 30 TABLET | Refills: 11 | Status: SHIPPED | OUTPATIENT
Start: 2020-02-18 | End: 2020-05-14

## 2020-02-18 RX ORDER — BACLOFEN 10 MG/1
10 TABLET ORAL DAILY PRN
COMMUNITY
Start: 2020-01-30 | End: 2021-02-19 | Stop reason: SDUPTHER

## 2020-02-18 NOTE — PROGRESS NOTES
Subjective:     Patient ID: Govind Manriquez is a 49 y.o. male.    History of Present Illness    Patient was seen back in the office for follow-up of migraine and chronic daily headaches.  He has not been compliant with taking the max 25 mg at night.  He states he takes this once or twice twice a week.  Treximet usually works for headaches.  He is been seen by Dr. Banegas  as well as pain management for neck pain.  He has been tried on amitriptyline and nortriptyline in the past.  She works as a nurse on the neuro floor.  He is afraid of side effects of medication.  The following portions of the patient's history were reviewed and updated as appropriate: allergies, current medications, past family history, past medical history, past social history, past surgical history and problem list.      Current Outpatient Medications:   •  baclofen (LIORESAL) 10 MG tablet, Take 10 mg by mouth 2 (Two) Times a Day As Needed., Disp: , Rfl:   •  rosuvastatin (CRESTOR) 20 MG tablet, Take 1 tablet by mouth Daily., Disp: 90 tablet, Rfl: 1  •  topiramate (TOPAMAX) 25 MG tablet, Take 1 tablet by mouth Every Night., Disp: 30 tablet, Rfl: 11  •  TREXIMET  MG per tablet, Take 1 tablet by mouth Every 2 (Two) Hours As Needed for Migraine. Take one tablet at onset of headache, Disp: 9 tablet, Rfl: 11  •  ketoconazole (NIZORAL) 2 % shampoo, Apply 1 application topically 2 (Two) Times a Week., Disp: 120 mL, Rfl: 1    Review of Systems   Constitutional: Negative for chills, fatigue and fever.   HENT: Negative for hearing loss, tinnitus and trouble swallowing.    Eyes: Positive for photophobia and visual disturbance. Negative for pain.   Respiratory: Negative for cough, shortness of breath and wheezing.    Cardiovascular: Negative for chest pain, palpitations and leg swelling.   Gastrointestinal: Positive for nausea and vomiting. Negative for diarrhea.   Endocrine: Negative for cold intolerance, heat intolerance and polydipsia.    Genitourinary: Negative for decreased urine volume, difficulty urinating and urgency.   Musculoskeletal: Positive for back pain and neck pain. Negative for neck stiffness.   Skin: Negative for color change, rash and wound.   Allergic/Immunologic: Negative for environmental allergies, food allergies and immunocompromised state.   Neurological: Positive for dizziness and headaches. Negative for tremors, seizures, syncope, facial asymmetry, speech difficulty, weakness, light-headedness and numbness.   Hematological: Negative for adenopathy. Does not bruise/bleed easily.   Psychiatric/Behavioral: Positive for sleep disturbance. Negative for confusion. The patient is not nervous/anxious.           I have reviewed ROS completed by medical assistant.     Objective:    Neurologic Exam  Mental status examination was appropriate.  Funduscopy, visual fields, eye movements and pupillary reflexes were normal.  No facial weakness was noted.  Gait was normal.  No pattern of focal weakness was noted.  Physical Exam    Assessment/Plan:     Govind was seen today for migraine.    Diagnoses and all orders for this visit:    Intractable migraine without aura and without status migrainosus    Tension headache    Neck pain    Other orders  -     topiramate (TOPAMAX) 25 MG tablet; Take 1 tablet by mouth Every Night.  -     TREXIMET  MG per tablet; Take 1 tablet by mouth Every 2 (Two) Hours As Needed for Migraine. Take one tablet at onset of headache         Is a compliance issue which negates Topamax is potential benefit.  Therefore he has agreed to take Topamax 25 mg every night for 2 or 3 months.  He could be a candidate for CGRP inhibitors.  Botox also is a consideration.  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  Refilled Treximet.  Prescription drug management-meds as above  Follow-up in the office in 3 months.  Thank you for allowing me to share in the care of your patient  Austin Anthony MD

## 2020-05-12 DIAGNOSIS — Z00.00 HEALTH CARE MAINTENANCE: Primary | ICD-10-CM

## 2020-05-13 LAB
ALBUMIN SERPL-MCNC: 4.9 G/DL (ref 3.5–5.2)
ALBUMIN/GLOB SERPL: 2.2 G/DL
ALP SERPL-CCNC: 57 U/L (ref 39–117)
ALT SERPL-CCNC: 52 U/L (ref 1–41)
AST SERPL-CCNC: 32 U/L (ref 1–40)
BASOPHILS # BLD AUTO: 0.12 10*3/MM3 (ref 0–0.2)
BASOPHILS NFR BLD AUTO: 1.6 % (ref 0–1.5)
BILIRUB SERPL-MCNC: 0.5 MG/DL (ref 0.2–1.2)
BUN SERPL-MCNC: 17 MG/DL (ref 6–20)
BUN/CREAT SERPL: 17.9 (ref 7–25)
CALCIUM SERPL-MCNC: 9.8 MG/DL (ref 8.6–10.5)
CHLORIDE SERPL-SCNC: 101 MMOL/L (ref 98–107)
CHOLEST SERPL-MCNC: 309 MG/DL (ref 0–200)
CO2 SERPL-SCNC: 25 MMOL/L (ref 22–29)
CREAT SERPL-MCNC: 0.95 MG/DL (ref 0.76–1.27)
EOSINOPHIL # BLD AUTO: 0.31 10*3/MM3 (ref 0–0.4)
EOSINOPHIL NFR BLD AUTO: 4.1 % (ref 0.3–6.2)
ERYTHROCYTE [DISTWIDTH] IN BLOOD BY AUTOMATED COUNT: 12.8 % (ref 12.3–15.4)
GLOBULIN SER CALC-MCNC: 2.2 GM/DL
GLUCOSE SERPL-MCNC: 100 MG/DL (ref 65–99)
HCT VFR BLD AUTO: 43.4 % (ref 37.5–51)
HDLC SERPL-MCNC: 51 MG/DL (ref 40–60)
HGB BLD-MCNC: 14.7 G/DL (ref 13–17.7)
IMM GRANULOCYTES # BLD AUTO: 0.02 10*3/MM3 (ref 0–0.05)
IMM GRANULOCYTES NFR BLD AUTO: 0.3 % (ref 0–0.5)
LDLC SERPL CALC-MCNC: 221 MG/DL (ref 0–100)
LDLC/HDLC SERPL: 4.33 {RATIO}
LYMPHOCYTES # BLD AUTO: 2.87 10*3/MM3 (ref 0.7–3.1)
LYMPHOCYTES NFR BLD AUTO: 37.5 % (ref 19.6–45.3)
MCH RBC QN AUTO: 30.6 PG (ref 26.6–33)
MCHC RBC AUTO-ENTMCNC: 33.9 G/DL (ref 31.5–35.7)
MCV RBC AUTO: 90.4 FL (ref 79–97)
MONOCYTES # BLD AUTO: 0.64 10*3/MM3 (ref 0.1–0.9)
MONOCYTES NFR BLD AUTO: 8.4 % (ref 5–12)
NEUTROPHILS # BLD AUTO: 3.69 10*3/MM3 (ref 1.7–7)
NEUTROPHILS NFR BLD AUTO: 48.1 % (ref 42.7–76)
NRBC BLD AUTO-RTO: 0 /100 WBC (ref 0–0.2)
PLATELET # BLD AUTO: 272 10*3/MM3 (ref 140–450)
POTASSIUM SERPL-SCNC: 4.7 MMOL/L (ref 3.5–5.2)
PROT SERPL-MCNC: 7.1 G/DL (ref 6–8.5)
RBC # BLD AUTO: 4.8 10*6/MM3 (ref 4.14–5.8)
SODIUM SERPL-SCNC: 140 MMOL/L (ref 136–145)
TRIGL SERPL-MCNC: 185 MG/DL (ref 0–150)
TSH SERPL DL<=0.005 MIU/L-ACNC: 1.26 UIU/ML (ref 0.27–4.2)
VLDLC SERPL CALC-MCNC: 37 MG/DL
WBC # BLD AUTO: 7.65 10*3/MM3 (ref 3.4–10.8)

## 2020-05-14 ENCOUNTER — OFFICE VISIT (OUTPATIENT)
Dept: NEUROLOGY | Facility: CLINIC | Age: 50
End: 2020-05-14

## 2020-05-14 DIAGNOSIS — M54.2 NECK PAIN: ICD-10-CM

## 2020-05-14 DIAGNOSIS — G43.019 INTRACTABLE MIGRAINE WITHOUT AURA AND WITHOUT STATUS MIGRAINOSUS: Primary | ICD-10-CM

## 2020-05-14 PROCEDURE — 99443 PR PHYS/QHP TELEPHONE EVALUATION 21-30 MIN: CPT | Performed by: PSYCHIATRY & NEUROLOGY

## 2020-05-14 RX ORDER — KETOROLAC TROMETHAMINE 15.75 MG/1
1 SPRAY, METERED NASAL AS NEEDED
Qty: 1 EACH | Refills: 11 | Status: SHIPPED | OUTPATIENT
Start: 2020-05-14 | End: 2020-08-14

## 2020-05-14 RX ORDER — SUMATRIPTAN SUCC/NAPROXEN SOD 85MG-500MG
1 TABLET ORAL
Qty: 9 TABLET | Refills: 11 | Status: SHIPPED | OUTPATIENT
Start: 2020-05-14 | End: 2020-08-14 | Stop reason: SDUPTHER

## 2020-05-15 ENCOUNTER — OFFICE VISIT (OUTPATIENT)
Dept: INTERNAL MEDICINE | Facility: CLINIC | Age: 50
End: 2020-05-15

## 2020-05-15 ENCOUNTER — RESULTS ENCOUNTER (OUTPATIENT)
Dept: INTERNAL MEDICINE | Facility: CLINIC | Age: 50
End: 2020-05-15

## 2020-05-15 ENCOUNTER — TELEPHONE (OUTPATIENT)
Dept: INTERNAL MEDICINE | Facility: CLINIC | Age: 50
End: 2020-05-15

## 2020-05-15 VITALS
HEART RATE: 85 BPM | HEIGHT: 69 IN | BODY MASS INDEX: 28.76 KG/M2 | DIASTOLIC BLOOD PRESSURE: 70 MMHG | SYSTOLIC BLOOD PRESSURE: 112 MMHG | OXYGEN SATURATION: 96 % | WEIGHT: 194.2 LBS

## 2020-05-15 DIAGNOSIS — M54.12 CERVICAL RADICULOPATHY: ICD-10-CM

## 2020-05-15 DIAGNOSIS — G44.209 TENSION HEADACHE: ICD-10-CM

## 2020-05-15 DIAGNOSIS — E78.5 HYPERLIPIDEMIA, UNSPECIFIED HYPERLIPIDEMIA TYPE: ICD-10-CM

## 2020-05-15 DIAGNOSIS — G43.009 MIGRAINE WITHOUT AURA AND WITHOUT STATUS MIGRAINOSUS, NOT INTRACTABLE: ICD-10-CM

## 2020-05-15 DIAGNOSIS — Z12.11 SCREENING FOR COLON CANCER: ICD-10-CM

## 2020-05-15 DIAGNOSIS — Z00.00 HEALTH CARE MAINTENANCE: Primary | ICD-10-CM

## 2020-05-15 DIAGNOSIS — E04.1 THYROID NODULE: ICD-10-CM

## 2020-05-15 PROCEDURE — 99396 PREV VISIT EST AGE 40-64: CPT | Performed by: INTERNAL MEDICINE

## 2020-05-15 PROCEDURE — 99213 OFFICE O/P EST LOW 20 MIN: CPT | Performed by: INTERNAL MEDICINE

## 2020-05-15 PROCEDURE — 93000 ELECTROCARDIOGRAM COMPLETE: CPT | Performed by: INTERNAL MEDICINE

## 2020-05-15 NOTE — TELEPHONE ENCOUNTER
PATIENT STATES THAT THE PHARMACY INFORMED HIM THAT HE NEEDS A PRIOR AUTH FOR Evolocumab (Repatha) 140 MG/ML solution prefilled syringe     PLEASE ADVISE

## 2020-05-15 NOTE — PROGRESS NOTES
"Subjective   Govind Manriquez is a 49 y.o. male and is here for a comprehensive physical exam. The patient reports problems - chronic HA.    He is seeing neuro  treximet does help  He is getting epidurals  Topamax did not help  He has been taking the crestor but not on a regular basis.  He thinks it makes his neck pain worse      Do you take any herbs or supplements that were not prescribed by a doctor? 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    Objective   /70 (BP Location: Left arm, Patient Position: Sitting)   Pulse 85   Ht 175.3 cm (69\")   Wt 88.1 kg (194 lb 3.2 oz)   SpO2 96%   BMI 28.68 kg/m²     General Appearance:    Alert, cooperative, no distress, appears stated age   Head:    Normocephalic, without obvious abnormality, atraumatic   Eyes:    PERRL, conjunctiva/corneas clear, EOM's intact, fundi     benign, both eyes        Ears:    Normal TM's and external ear canals, both ears   Nose:   Nares normal, septum midline, mucosa normal, no drainage    or sinus tenderness   Throat:   Lips, mucosa, and tongue normal; teeth and gums normal   Neck:   Supple, symmetrical, trachea midline, " no adenopathy;        thyroid:  No enlargement/tenderness/nodules; no carotid    bruit or JVD   Back:     Symmetric, no curvature, ROM normal, no CVA tenderness   Lungs:     Clear to auscultation bilaterally, respirations unlabored   Chest wall:    No tenderness or deformity   Heart:    Regular rate and rhythm, S1 and S2 normal, no murmur, rub   or gallop   Abdomen:     Soft, non-tender, bowel sounds active all four quadrants,     no masses, no organomegaly           Extremities:   Extremities normal, atraumatic, no cyanosis or edema   Pulses:   2+ and symmetric all extremities   Skin:   Skin color, texture, turgor normal, no rashes or lesions   Lymph nodes:   Cervical, supraclavicular, and axillary nodes normal   Neurologic:   CNII-XII intact. Normal strength, sensation and reflexes       throughout       Vitals:    05/15/20 0832   BP: 112/70   Pulse: 85   SpO2: 96%     Body mass index is 28.68 kg/m².      Medications:   Current Outpatient Medications:   •  baclofen (LIORESAL) 10 MG tablet, Take 10 mg by mouth Daily As Needed., Disp: , Rfl:   •  ketoconazole (NIZORAL) 2 % shampoo, Apply 1 application topically 2 (Two) Times a Week., Disp: 120 mL, Rfl: 1  •  Ketorolac Tromethamine (Sprix) 15.75 MG/SPRAY solution, 1 spray into the nostril(s) as directed by provider As Needed (headache)., Disp: 1 each, Rfl: 11  •  rosuvastatin (CRESTOR) 20 MG tablet, Take 1 tablet by mouth Daily., Disp: 90 tablet, Rfl: 1  •  TREXIMET  MG per tablet, Take 1 tablet by mouth Every 2 (Two) Hours As Needed for Migraine. Take one tablet at onset of headache, Disp: 9 tablet, Rfl: 11    EKG: Normal sinus rhythm no acute ST changes no significant change compared to EKG done in 2017     Assessment/Plan   Healthy male exam.      1. Healthcare Maintenance:  2. Patient Counseling:  --Nutrition: Stressed importance of moderation in sodium/caffeine intake, saturated fat and cholesterol, caloric balance, sufficient intake of fresh fruits,  vegetables, fiber, calcium and vit D  --Exercise: I have rec reg exercise  --Substance Abuse: no tob no etoh  --Dental health: he does go to the dentist reg  --Immunizations reviewed.  --Discussed benefits of screening colonoscopy.  3.  HPL-  Very high  Intolerant to statin  We will try repatha.  Patient has been noncompliant in the past with his cholesterol medications.  She has a very strong family history of coronary artery disease and this level does concern me.  I explained this to him at length  4.  Migraine headaches: Neurology is working on this they have been on Treximet hopefully they can find a prophylactic he can take so we will not need to take Treximet very often

## 2020-05-22 ENCOUNTER — TELEPHONE (OUTPATIENT)
Dept: INTERNAL MEDICINE | Facility: CLINIC | Age: 50
End: 2020-05-22

## 2020-05-22 NOTE — TELEPHONE ENCOUNTER
PT ADVISED THAT THE PHARMACY TOLD HIM HE NEEDS A PA FOR REPATHA AND THEY HAVE SENT US A REQUEST WITH NO RESPONSE.

## 2020-06-01 DIAGNOSIS — E78.5 HYPERLIPIDEMIA, UNSPECIFIED HYPERLIPIDEMIA TYPE: ICD-10-CM

## 2020-06-01 RX ORDER — ROSUVASTATIN CALCIUM 20 MG/1
TABLET, COATED ORAL
Qty: 90 TABLET | Refills: 1 | Status: SHIPPED | OUTPATIENT
Start: 2020-06-01 | End: 2020-08-14

## 2020-06-09 ENCOUNTER — OFFICE VISIT (OUTPATIENT)
Dept: INTERNAL MEDICINE | Facility: CLINIC | Age: 50
End: 2020-06-09

## 2020-06-09 VITALS
HEIGHT: 69 IN | HEART RATE: 112 BPM | SYSTOLIC BLOOD PRESSURE: 120 MMHG | TEMPERATURE: 98 F | WEIGHT: 194 LBS | BODY MASS INDEX: 28.73 KG/M2 | DIASTOLIC BLOOD PRESSURE: 80 MMHG | OXYGEN SATURATION: 98 %

## 2020-06-09 DIAGNOSIS — K21.9 GASTROESOPHAGEAL REFLUX DISEASE, ESOPHAGITIS PRESENCE NOT SPECIFIED: Primary | ICD-10-CM

## 2020-06-09 PROCEDURE — 99214 OFFICE O/P EST MOD 30 MIN: CPT | Performed by: INTERNAL MEDICINE

## 2020-06-09 RX ORDER — DEXLANSOPRAZOLE 60 MG/1
60 CAPSULE, DELAYED RELEASE ORAL DAILY
Qty: 30 CAPSULE | Refills: 0 | Status: SHIPPED | OUTPATIENT
Start: 2020-06-09 | End: 2020-07-01

## 2020-06-09 NOTE — PROGRESS NOTES
Subjective   Govind Manriquez is a 49 y.o. male.   He has been having burning pain in chest and throat    History of Present Illness   He is taking prevacid and pepcid. Every day.  He says this started after he had a bad migraine and had an episaode of emeis  He is sleeping on a raised bed and taking PPI in the am before he eats.  Patient is taking multiple over-the-counter remedies including Pepcid and proton pump inhibitors as well as some Tums and different liquid preparations.  He needs Maalox and Mylanta.  Those are helping intermittently but then comes right back    The following portions of the patient's history were reviewed and updated as appropriate: allergies, current medications, past medical history, past social history and problem list.  He is trying to watch diet    Review of Systems   Respiratory: Negative.    Cardiovascular: Negative.    Gastrointestinal: Positive for nausea. Negative for blood in stool, constipation, diarrhea, rectal pain and vomiting.   All other systems reviewed and are negative.      Objective   Physical Exam   Constitutional: He is oriented to person, place, and time. He appears well-developed and well-nourished.   HENT:   Head: Normocephalic and atraumatic.   Right Ear: External ear normal.   Left Ear: External ear normal.   Mouth/Throat: Oropharynx is clear and moist.   Eyes: Pupils are equal, round, and reactive to light. Conjunctivae and EOM are normal.   Neck: Normal range of motion. No tracheal deviation present. No thyromegaly present.   Cardiovascular: Normal rate, regular rhythm, normal heart sounds and intact distal pulses.   Pulmonary/Chest: Effort normal and breath sounds normal.   Abdominal: Soft. Bowel sounds are normal. He exhibits no distension. There is no tenderness.   Musculoskeletal: Normal range of motion. He exhibits no edema or deformity.   Neurological: He is alert and oriented to person, place, and time.   Skin: Skin is warm and dry.   Psychiatric: He  has a normal mood and affect. His behavior is normal. Judgment and thought content normal.   Vitals reviewed.      Vitals:    06/09/20 1534   BP: 120/80   Pulse: 112   Temp: 98 °F (36.7 °C)   SpO2: 98%     Body mass index is 28.64 kg/m².         Assessment/Plan   Govind was seen today for heartburn.    Diagnoses and all orders for this visit:    Gastroesophageal reflux disease, esophagitis presence not specified  -     Ambulatory Referral to Gastroenterology    Other orders  -     dexlansoprazole (Dexilant) 60 MG capsule; Take 1 capsule by mouth Daily for 30 days.      1.  GERD: Patient with significantly worsening symptoms I think that taking a Tums periodically is causing some rebound symptoms.  We are going to change him to Dexilant every morning with Pepcid at night in between he is to try FD guard.  He is going to watch his diet closely.  Symptoms are so profound and acutely worsening we are going to have him see GI as he may need a scope

## 2020-07-01 ENCOUNTER — PREP FOR SURGERY (OUTPATIENT)
Dept: OTHER | Facility: HOSPITAL | Age: 50
End: 2020-07-01

## 2020-07-01 ENCOUNTER — OFFICE VISIT (OUTPATIENT)
Dept: GASTROENTEROLOGY | Facility: CLINIC | Age: 50
End: 2020-07-01

## 2020-07-01 VITALS
SYSTOLIC BLOOD PRESSURE: 118 MMHG | TEMPERATURE: 97.7 F | WEIGHT: 193.6 LBS | OXYGEN SATURATION: 98 % | HEART RATE: 91 BPM | DIASTOLIC BLOOD PRESSURE: 70 MMHG | BODY MASS INDEX: 28.68 KG/M2 | HEIGHT: 69 IN

## 2020-07-01 DIAGNOSIS — R74.01 ELEVATED ALT MEASUREMENT: ICD-10-CM

## 2020-07-01 DIAGNOSIS — Z12.11 COLON CANCER SCREENING: ICD-10-CM

## 2020-07-01 DIAGNOSIS — K21.9 GASTROESOPHAGEAL REFLUX DISEASE, ESOPHAGITIS PRESENCE NOT SPECIFIED: Primary | ICD-10-CM

## 2020-07-01 DIAGNOSIS — R11.2 NAUSEA AND VOMITING, INTRACTABILITY OF VOMITING NOT SPECIFIED, UNSPECIFIED VOMITING TYPE: ICD-10-CM

## 2020-07-01 DIAGNOSIS — K76.0 FATTY LIVER: ICD-10-CM

## 2020-07-01 DIAGNOSIS — R11.0 NAUSEA: ICD-10-CM

## 2020-07-01 PROCEDURE — 99204 OFFICE O/P NEW MOD 45 MIN: CPT | Performed by: INTERNAL MEDICINE

## 2020-07-01 RX ORDER — DEXLANSOPRAZOLE 60 MG/1
CAPSULE, DELAYED RELEASE ORAL
Qty: 30 CAPSULE | Refills: 5 | Status: SHIPPED | OUTPATIENT
Start: 2020-07-01 | End: 2020-07-01 | Stop reason: SDUPTHER

## 2020-07-01 RX ORDER — DEXLANSOPRAZOLE 60 MG/1
1 CAPSULE, DELAYED RELEASE ORAL DAILY
Qty: 30 CAPSULE | Refills: 5 | Status: SHIPPED | OUTPATIENT
Start: 2020-07-01 | End: 2020-08-14 | Stop reason: SDUPTHER

## 2020-07-01 NOTE — PROGRESS NOTES
Establish Care (New Pt ); GI Problem; and Heartburn (Possible )      HPI  Patient here in consultation for evaluation and management of refractory GERD symptoms.    He had an ultrasound in 2019 showing borderline fatty liver change.  In May of this year his ALT was elevated at 52.    Patient complains of worsening GERD.  This occurs all day long.  He has tried multiple different proton pump inhibitors including Nexium Prevacid and Dexilant without any significant relief.  He denies dysphagia.  He describes the discomfort as being in the chest and radiating up into the throat.  It is a burning sensation.  It occurs daily.  It is not relieved by sitting up.  There are no particular foods that seem to make it worse.  He has not gained any significant amount of weight.  He has no family history of Navas's esophagus or esophageal cancer.  He did have several episodes of nausea and vomiting that he associated with a migraine headache.  He does request a refill on his Dexilant.    Patient denies any rectal bleeding but he has had some occasional loose stools.  He has not yet had a colonoscopy for screening.    Ultrasound the liver showed mild fatty liver change he does have an elevated ALT level.  No cholelithiasis reported.  Patient denies any significant alcohol use.  No family history of cirrhosis or liver disease.    Review of Systems   Constitutional: Negative for appetite change, chills, diaphoresis, fatigue, fever and unexpected weight change.   HENT: Negative for dental problem, ear pain, mouth sores, rhinorrhea, sore throat and voice change.    Eyes: Negative for pain, redness and visual disturbance.   Respiratory: Negative for cough, chest tightness and wheezing.    Cardiovascular: Negative for chest pain, palpitations and leg swelling.   Endocrine: Negative for cold intolerance, heat intolerance, polydipsia, polyphagia and polyuria.   Genitourinary: Negative for dysuria, frequency, hematuria and urgency.    Musculoskeletal: Negative for arthralgias, back pain, joint swelling, myalgias and neck pain.   Skin: Negative for rash.   Allergic/Immunologic: Negative for environmental allergies, food allergies and immunocompromised state.   Neurological: Negative for dizziness, seizures, weakness, numbness and headaches.   Hematological: Does not bruise/bleed easily.   Psychiatric/Behavioral: Negative for sleep disturbance. The patient is not nervous/anxious.         I have reviewed and confirmed the accuracy of the HPI and ROS as documented by the APRN Iron Gauthier MD     Problem List:    Patient Active Problem List   Diagnosis   • Hyperlipidemia   • Intractable migraine without aura and without status migrainosus   • Cervical radiculopathy   • Neck pain   • Migraine without aura, not intractable   • Tension headache   • Chronic bilateral thoracic back pain   • Chronic bilateral low back pain without sciatica       Medical History:    Past Medical History:   Diagnosis Date   • GERD (gastroesophageal reflux disease)    • Hyperlipidemia    • Migraine    • Neck pain         Social History:    Social History     Socioeconomic History   • Marital status:      Spouse name: Natasha Manriquez   • Number of children: 1   • Years of education: Not on file   • Highest education level: Not on file   Occupational History   • 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[employer]   Tobacco Use   • Smoking status: Never Smoker   • Smokeless tobacco: Never Used   Substance and Sexual Activity   • Alcohol use: No   • Drug use: No   • Sexual activity: Defer   Social History Narrative    15 yo son       Family History:   Family History   Problem Relation Age of Onset   • Heart disease Father 56        started with CAD in 30s non-smoker but obese       Surgical History:   Past Surgical History:   Procedure Laterality Date   • KNEE CARTILAGE SURGERY Left          Current Outpatient Medications:   •  baclofen (LIORESAL) 10 MG tablet, Take  10 mg by mouth Daily As Needed., Disp: , Rfl:   •  DEXILANT 60 MG capsule, TAKE 1 CAPSULE BY MOUTH EVERY DAY, Disp: 30 capsule, Rfl: 5  •  Evolocumab (Repatha) 140 MG/ML solution prefilled syringe, Inject 1 mL under the skin into the appropriate area as directed Every 14 (Fourteen) Days., Disp: 2.1 mL, Rfl: 5  •  ketoconazole (NIZORAL) 2 % shampoo, Apply 1 application topically 2 (Two) Times a Week., Disp: 120 mL, Rfl: 1  •  Ketorolac Tromethamine (Sprix) 15.75 MG/SPRAY solution, 1 spray into the nostril(s) as directed by provider As Needed (headache)., Disp: 1 each, Rfl: 11  •  rosuvastatin (CRESTOR) 20 MG tablet, TAKE 1 TABLET BY MOUTH EVERY DAY, Disp: 90 tablet, Rfl: 1  •  TREXIMET  MG per tablet, Take 1 tablet by mouth Every 2 (Two) Hours As Needed for Migraine. Take one tablet at onset of headache, Disp: 9 tablet, Rfl: 11    Allergies:   Allergies   Allergen Reactions   • Zocor [Simvastatin] Anaphylaxis     fatigue        The following portions of the patient's history were reviewed and updated as appropriate: allergies, current medications, past family history, past medical history, past social history, past surgical history and problem list.    Vitals:    07/01/20 1026   BP: 118/70   Pulse: 91   Temp: 97.7 °F (36.5 °C)   SpO2: 98%         07/01/20  1026   Weight: 87.8 kg (193 lb 9.6 oz)     Body mass index is 28.58 kg/m².      PHYSICAL EXAM:  Physical Exam   Constitutional: He appears well-developed.   HENT:   Nose: Nose normal. No nasal deformity.   Eyes: No scleral icterus.   Neck: No tracheal deviation present.   Pulmonary/Chest: Effort normal and breath sounds normal. No respiratory distress.   Abdominal: Soft. Normal appearance and bowel sounds are normal. He exhibits no shifting dullness and no distension. There is no hepatosplenomegaly. There is no tenderness. There is no rigidity, no rebound and no guarding. No hernia.   Lymphadenopathy:   No periumbilical lymphadenopathy   Neurological: He is  alert.   Skin: Skin is warm. No cyanosis.   Psychiatric: He has a normal mood and affect. His behavior is normal.   Vitals reviewed.          Assessment/ Plan  Govind was seen today for establish care, gi problem and heartburn.    Diagnoses and all orders for this visit:    Gastroesophageal reflux disease, esophagitis presence not specified    Elevated ALT measurement    Colon cancer screening    Fatty liver    Nausea    Nausea and vomiting, intractability of vomiting not specified, unspecified vomiting type         No follow-ups on file.    There are no Patient Instructions on file for this visit.        Discussion:  We will proceed with an EGD due to worsening reflux.  I advised him regarding antireflux measures.  If his EGD is negative, may consider gastric emptying study or 24 hour pH plus impedance.    We will proceed with a colonoscopy for colon cancer screening.    I talked to him regarding fatty liver and efforts at maintaining a low fat, low carbohydrate diet.    We will monitor ALT levels but this is likely due to mild fatty liver change in the setting of statin therapy.  No need to discontinue the statin.

## 2020-07-10 ENCOUNTER — LAB REQUISITION (OUTPATIENT)
Dept: LAB | Facility: HOSPITAL | Age: 50
End: 2020-07-10

## 2020-07-10 DIAGNOSIS — Z00.00 ENCOUNTER FOR GENERAL ADULT MEDICAL EXAMINATION WITHOUT ABNORMAL FINDINGS: ICD-10-CM

## 2020-07-11 PROCEDURE — U0004 COV-19 TEST NON-CDC HGH THRU: HCPCS | Performed by: INTERNAL MEDICINE

## 2020-07-14 LAB
REF LAB TEST METHOD: NORMAL
SARS-COV-2 RNA RESP QL NAA+PROBE: NOT DETECTED

## 2020-07-15 ENCOUNTER — OUTSIDE FACILITY SERVICE (OUTPATIENT)
Dept: GASTROENTEROLOGY | Facility: CLINIC | Age: 50
End: 2020-07-15

## 2020-07-15 ENCOUNTER — LAB REQUISITION (OUTPATIENT)
Dept: LAB | Facility: HOSPITAL | Age: 50
End: 2020-07-15

## 2020-07-15 DIAGNOSIS — K21.9 GASTRO-ESOPHAGEAL REFLUX DISEASE WITHOUT ESOPHAGITIS: ICD-10-CM

## 2020-07-15 PROCEDURE — 43239 EGD BIOPSY SINGLE/MULTIPLE: CPT | Performed by: INTERNAL MEDICINE

## 2020-07-15 PROCEDURE — 88305 TISSUE EXAM BY PATHOLOGIST: CPT | Performed by: INTERNAL MEDICINE

## 2020-07-15 PROCEDURE — 45385 COLONOSCOPY W/LESION REMOVAL: CPT | Performed by: INTERNAL MEDICINE

## 2020-07-17 LAB
CYTO UR: NORMAL
LAB AP CASE REPORT: NORMAL
LAB AP CLINICAL INFORMATION: NORMAL
PATH REPORT.FINAL DX SPEC: NORMAL
PATH REPORT.GROSS SPEC: NORMAL

## 2020-08-10 DIAGNOSIS — E78.5 HYPERLIPIDEMIA, UNSPECIFIED HYPERLIPIDEMIA TYPE: ICD-10-CM

## 2020-08-11 LAB
ALBUMIN SERPL-MCNC: 4.9 G/DL (ref 3.5–5.2)
ALBUMIN/GLOB SERPL: 2.7 G/DL
ALP SERPL-CCNC: 59 U/L (ref 39–117)
ALT SERPL-CCNC: 30 U/L (ref 1–41)
AST SERPL-CCNC: 26 U/L (ref 1–40)
BILIRUB SERPL-MCNC: 0.3 MG/DL (ref 0–1.2)
BUN SERPL-MCNC: 16 MG/DL (ref 6–20)
BUN/CREAT SERPL: 16 (ref 7–25)
CALCIUM SERPL-MCNC: 9.5 MG/DL (ref 8.6–10.5)
CHLORIDE SERPL-SCNC: 102 MMOL/L (ref 98–107)
CHOLEST SERPL-MCNC: 192 MG/DL (ref 0–200)
CO2 SERPL-SCNC: 24 MMOL/L (ref 22–29)
CREAT SERPL-MCNC: 1 MG/DL (ref 0.76–1.27)
GLOBULIN SER CALC-MCNC: 1.8 GM/DL
GLUCOSE SERPL-MCNC: 99 MG/DL (ref 65–99)
HDLC SERPL-MCNC: 51 MG/DL (ref 40–60)
LDLC SERPL CALC-MCNC: 96 MG/DL (ref 0–100)
LDLC/HDLC SERPL: 1.87 {RATIO}
POTASSIUM SERPL-SCNC: 4.5 MMOL/L (ref 3.5–5.2)
PROT SERPL-MCNC: 6.7 G/DL (ref 6–8.5)
SODIUM SERPL-SCNC: 139 MMOL/L (ref 136–145)
TRIGL SERPL-MCNC: 227 MG/DL (ref 0–150)
VLDLC SERPL CALC-MCNC: 45.4 MG/DL

## 2020-08-14 ENCOUNTER — OFFICE VISIT (OUTPATIENT)
Dept: INTERNAL MEDICINE | Facility: CLINIC | Age: 50
End: 2020-08-14

## 2020-08-14 VITALS
DIASTOLIC BLOOD PRESSURE: 74 MMHG | WEIGHT: 191.1 LBS | SYSTOLIC BLOOD PRESSURE: 110 MMHG | OXYGEN SATURATION: 97 % | TEMPERATURE: 97.7 F | HEART RATE: 85 BPM | HEIGHT: 69 IN | BODY MASS INDEX: 28.3 KG/M2

## 2020-08-14 DIAGNOSIS — R07.89 CHEST TIGHTNESS: ICD-10-CM

## 2020-08-14 DIAGNOSIS — E78.5 HYPERLIPIDEMIA, UNSPECIFIED HYPERLIPIDEMIA TYPE: Primary | ICD-10-CM

## 2020-08-14 DIAGNOSIS — G43.019 INTRACTABLE MIGRAINE WITHOUT AURA AND WITHOUT STATUS MIGRAINOSUS: ICD-10-CM

## 2020-08-14 PROCEDURE — 99214 OFFICE O/P EST MOD 30 MIN: CPT | Performed by: INTERNAL MEDICINE

## 2020-08-14 RX ORDER — TOPIRAMATE 25 MG/1
25 CAPSULE, COATED PELLETS ORAL DAILY
COMMUNITY
End: 2020-08-14 | Stop reason: SDUPTHER

## 2020-08-14 RX ORDER — DEXLANSOPRAZOLE 60 MG/1
1 CAPSULE, DELAYED RELEASE ORAL DAILY
Qty: 90 CAPSULE | Refills: 3 | Status: SHIPPED | OUTPATIENT
Start: 2020-08-14 | End: 2021-02-19 | Stop reason: SDUPTHER

## 2020-08-14 RX ORDER — TOPIRAMATE 25 MG/1
25 CAPSULE, COATED PELLETS ORAL DAILY
Qty: 90 CAPSULE | Refills: 3 | Status: SHIPPED | OUTPATIENT
Start: 2020-08-14 | End: 2021-02-19 | Stop reason: SDUPTHER

## 2020-08-14 RX ORDER — SUMATRIPTAN SUCC/NAPROXEN SOD 85MG-500MG
1 TABLET ORAL
Qty: 27 TABLET | Refills: 3 | Status: SHIPPED | OUTPATIENT
Start: 2020-08-14 | End: 2021-02-19 | Stop reason: SDUPTHER

## 2020-08-14 NOTE — PROGRESS NOTES
Subjective   Govind Manriquez is a 49 y.o. male.     History of Present Illness   He is doing well with repatha   Migraine HA-  He is doing well with current meds  He does have a sore throat int and this has been going on for a while  He wonders if it is the repatha    The following portions of the patient's history were reviewed and updated as appropriate: allergies, current medications, past medical history, past social history and problem list.    Review of Systems   All other systems reviewed and are negative.      Objective   Physical Exam   Constitutional: He is oriented to person, place, and time. He appears well-developed and well-nourished.   HENT:   Head: Normocephalic and atraumatic.   Right Ear: External ear normal.   Left Ear: External ear normal.   Mouth/Throat: Oropharynx is clear and moist.   Eyes: Pupils are equal, round, and reactive to light. Conjunctivae and EOM are normal.   Neck: Normal range of motion. No tracheal deviation present. No thyromegaly present.   Cardiovascular: Normal rate, regular rhythm and normal heart sounds.   Pulmonary/Chest: Effort normal and breath sounds normal.   Abdominal: Soft. He exhibits no distension. There is no tenderness.   Musculoskeletal: Normal range of motion. He exhibits no edema or deformity.   Neurological: He is alert and oriented to person, place, and time.   Skin: Skin is warm and dry.   Psychiatric: He has a normal mood and affect. His behavior is normal. Judgment and thought content normal.   Vitals reviewed.      Vitals:    08/14/20 0748   BP: 110/74   Pulse: 85   Temp: 97.7 °F (36.5 °C)   SpO2: 97%     Body mass index is 28.22 kg/m².         Assessment/Plan   Diagnoses and all orders for this visit:    Hyperlipidemia, unspecified hyperlipidemia type    Intractable migraine without aura and without status migrainosus    Chest tightness  -     Ambulatory Referral to Cardiology    Other orders  -     dexlansoprazole (Dexilant) 60 MG capsule; Take 1  capsule by mouth Daily.  -     TREXIMET  MG per tablet; Take 1 tablet by mouth Every 2 (Two) Hours As Needed for Migraine. Take one tablet at onset of headache  -     Evolocumab (Repatha) 140 MG/ML solution prefilled syringe; Inject 1 mL under the skin into the appropriate area as directed Every 14 (Fourteen) Days.  -     topiramate (TOPAMAX) 25 MG capsule (sprinkle); Take 1 capsule by mouth Daily.    1.  Chest tightness-  He has seen GI and had a scope  The dexilant does not seem to help this sx  Father had sig cad at his age  Refer to cards  2.  HPL- doing with repatha  3.  Migraine-

## 2020-09-24 ENCOUNTER — OFFICE VISIT (OUTPATIENT)
Dept: CARDIOLOGY | Facility: CLINIC | Age: 50
End: 2020-09-24

## 2020-09-24 VITALS
WEIGHT: 188.6 LBS | DIASTOLIC BLOOD PRESSURE: 84 MMHG | RESPIRATION RATE: 20 BRPM | SYSTOLIC BLOOD PRESSURE: 128 MMHG | HEIGHT: 60 IN | HEART RATE: 93 BPM | BODY MASS INDEX: 37.03 KG/M2

## 2020-09-24 DIAGNOSIS — R06.83 SNORING: ICD-10-CM

## 2020-09-24 DIAGNOSIS — Z82.49 FAMILY HISTORY OF CORONARY ARTERY DISEASE: ICD-10-CM

## 2020-09-24 DIAGNOSIS — M79.661 RIGHT CALF PAIN: ICD-10-CM

## 2020-09-24 DIAGNOSIS — R07.2 PRECORDIAL PAIN: Primary | ICD-10-CM

## 2020-09-24 DIAGNOSIS — E78.2 MIXED HYPERLIPIDEMIA: ICD-10-CM

## 2020-09-24 PROCEDURE — 93000 ELECTROCARDIOGRAM COMPLETE: CPT | Performed by: INTERNAL MEDICINE

## 2020-09-24 PROCEDURE — 99204 OFFICE O/P NEW MOD 45 MIN: CPT | Performed by: INTERNAL MEDICINE

## 2020-09-24 NOTE — PROGRESS NOTES
Date of Office Visit: 2020  Encounter Provider: Maranda Knapp MD  Place of Service: [facility] CARDIOLOGY  Patient Name: Govind Manriquez  :1970      Patient ID:  Govind Manriquez is a 49 y.o. male is here for chest tightness          History of Present Illness    He has a history of hyperlipidemia controlled with Repatha.  He has been allergic to simvastatin and some intolerance to rosuvastatin in the past.  He also has migraine headaches.    He is  to Anum Manriquez.  We know them from [employer].  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  He Uses no cigarettes, alcohol or drugs and has 1 cup of coffee 2 days/week.    His father had hypertension, heart disease with myocardial infarction's beginning in his 30s and  with heart disease at the age of 56.    Labs on 2020 show normal CMP, LDL 96, HDL 51, triglycerides 217.    He has noticed some intermittent chest tightness and neck tightness.  He does have some neck issues as well as back issues and thought it might be related but he had neck tightness with a cough and was not feeling well.  He then began to have nausea and vomiting after taking some fiber trying to help with the cough and his sternum began to burn.  He was sent for an EGD and colonoscopy which were normal but was started on Dexilant.  He started the Dexilant it seemed to help but then he stopped it.  Even despite being on Dexilant, that the throat tightness never went away but the sternal burning got better.  He is known to have thyroid nodules which have been monitored and stable.  He occasionally gets intermittent left upper chest stabbing but is not persistent.  He has noticed that the neck tightness gets slightly worse with exercise.  In addition, he snores.  He has not recently been tested for obstructive sleep apnea.  He is woken in the middle night with his heart racing up into the 130s and 140s.  He has no known rhythm  disturbances.  He also has had right calf pain which is been pretty significant for the last couple days, without swelling.  He has no fevers, chills or cough.    Past Medical History:   Diagnosis Date   • GERD (gastroesophageal reflux disease)    • Hyperlipidemia    • Migraine    • Neck pain          Past Surgical History:   Procedure Laterality Date   • KNEE CARTILAGE SURGERY Left        Current Outpatient Medications on File Prior to Visit   Medication Sig Dispense Refill   • baclofen (LIORESAL) 10 MG tablet Take 10 mg by mouth Daily As Needed.     • dexlansoprazole (Dexilant) 60 MG capsule Take 1 capsule by mouth Daily. 90 capsule 3   • Evolocumab (Repatha) 140 MG/ML solution prefilled syringe Inject 1 mL under the skin into the appropriate area as directed Every 14 (Fourteen) Days. 6 syringe 3   • ketoconazole (NIZORAL) 2 % shampoo Apply 1 application topically 2 (Two) Times a Week. 120 mL 1   • topiramate (TOPAMAX) 25 MG capsule (sprinkle) Take 1 capsule by mouth Daily. 90 capsule 3   • TREXIMET  MG per tablet Take 1 tablet by mouth Every 2 (Two) Hours As Needed for Migraine. Take one tablet at onset of headache 27 tablet 3     No current facility-administered medications on file prior to visit.        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   HENT: Negative for congestion.    Eyes: Negative for vision loss in left eye and vision loss in right eye.   Respiratory: Negative.  Negative for cough,  "hemoptysis, shortness of breath, sleep disturbances due to breathing, snoring, sputum production and wheezing.    Endocrine: Negative.    Hematologic/Lymphatic: Negative.    Skin: Negative for poor wound healing and rash.   Musculoskeletal: Negative for falls, gout, muscle cramps and myalgias.   Gastrointestinal: Negative for abdominal pain, diarrhea, dysphagia, hematemesis, melena, nausea and vomiting.   Neurological: Negative for excessive daytime sleepiness, dizziness, headaches, light-headedness, loss of balance, seizures and vertigo.   Psychiatric/Behavioral: Negative for depression and substance abuse. The patient is not nervous/anxious.        Procedures    ECG 12 Lead    Date/Time: 9/24/2020 9:10 AM  Performed by: Maranda Knapp MD  Authorized by: Maranda Knapp MD   Comparison: compared with previous ECG   Similar to previous ECG  Rhythm: sinus rhythm    Clinical impression: normal ECG                Objective:      Vitals:    09/24/20 0900 09/24/20 0903   BP: 132/84 128/84   BP Location: Right arm Left arm   Pulse: 93    Resp: 20    Weight: 85.5 kg (188 lb 9.6 oz)    Height: 152.4 cm (60\")      Body mass index is 36.83 kg/m².    Vitals signs reviewed.   Constitutional:       General: Not in acute distress.     Appearance: Well-developed. Not diaphoretic.   Eyes:      General: No scleral icterus.     Conjunctiva/sclera: Conjunctivae normal.   HENT:      Head: Normocephalic and atraumatic.   Neck:      Musculoskeletal: Neck supple.      Thyroid: No thyromegaly.      Vascular: No carotid bruit or JVD.      Lymphadenopathy: No cervical adenopathy.   Pulmonary:      Effort: Pulmonary effort is normal. No respiratory distress.      Breath sounds: Normal breath sounds. No wheezing. No rhonchi. No rales.   Chest:      Chest wall: Not tender to palpatation.   Cardiovascular:      Normal rate. Regular rhythm.      No gallop.   Pulses:     Intact distal pulses.   Edema:     Peripheral edema absent. "   Abdominal:      General: Bowel sounds are normal. There is no distension or abdominal bruit.      Palpations: Abdomen is soft. There is no abdominal mass.      Tenderness: There is no abdominal tenderness.   Musculoskeletal:         General: No deformity.      Extremities: No clubbing present.  Skin:     General: Skin is warm and dry. There is no cyanosis.      Coloration: Skin is not pale.      Findings: No rash.   Neurological:      Mental Status: Alert and oriented to person, place, and time.      Cranial Nerves: No cranial nerve deficit.   Psychiatric:         Judgment: Judgment normal.         Lab Review:       Assessment:      Diagnosis Plan   1. Precordial pain  Treadmill Stress Test    Holter Monitor - 24 Hour    Ambulatory Referral to Sleep Medicine    CT Cardiac Calcium Score Without Dye   2. Mixed hyperlipidemia  Treadmill Stress Test    Holter Monitor - 24 Hour    CT Cardiac Calcium Score Without Dye   3. Right calf pain  Duplex Venous Lower Extremity - Bilateral CAR   4. Snoring  Ambulatory Referral to Sleep Medicine   5. Family history of coronary artery disease  CT Cardiac Calcium Score Without Dye     1. Throat tightness, slightly worse with exercise.  Has cardiovascular risks including family history and hyperlipidemia.  Set up treadmill stress study.  2. Tachycardia noted in middle the night, set up monitor.  3. Snoring, no recent sleep studies, refer to sleep medicine.  4. Strong family history of premature cardiovascular disease.  5. Hyperlipidemia, on Repatha for this as has a history of statin intolerances.  6. Right calf pain new onset over the last couple of days, rule out DVT.     Plan:       No follow-up set, no medication changes.  We will set up testing including calcium score.

## 2020-09-25 ENCOUNTER — APPOINTMENT (OUTPATIENT)
Dept: CARDIOLOGY | Facility: HOSPITAL | Age: 50
End: 2020-09-25

## 2020-10-07 ENCOUNTER — HOSPITAL ENCOUNTER (OUTPATIENT)
Dept: CT IMAGING | Facility: HOSPITAL | Age: 50
End: 2020-10-07

## 2020-10-15 ENCOUNTER — OFFICE VISIT (OUTPATIENT)
Dept: SLEEP MEDICINE | Facility: HOSPITAL | Age: 50
End: 2020-10-15

## 2020-10-15 VITALS
HEIGHT: 69 IN | OXYGEN SATURATION: 97 % | WEIGHT: 189 LBS | BODY MASS INDEX: 27.99 KG/M2 | SYSTOLIC BLOOD PRESSURE: 122 MMHG | DIASTOLIC BLOOD PRESSURE: 84 MMHG | HEART RATE: 72 BPM

## 2020-10-15 DIAGNOSIS — G47.30 OBSERVED SLEEP APNEA: Primary | ICD-10-CM

## 2020-10-15 DIAGNOSIS — R07.2 PRECORDIAL PAIN: ICD-10-CM

## 2020-10-15 DIAGNOSIS — R06.83 SNORING: ICD-10-CM

## 2020-10-15 DIAGNOSIS — G47.10 HYPERSOMNIA: ICD-10-CM

## 2020-10-15 PROCEDURE — G0463 HOSPITAL OUTPT CLINIC VISIT: HCPCS

## 2020-10-15 PROCEDURE — 99244 OFF/OP CNSLTJ NEW/EST MOD 40: CPT | Performed by: INTERNAL MEDICINE

## 2020-10-15 NOTE — PROGRESS NOTES
[facility]  [address] [facility]  [address] [place] [address]  Phone   Fax       Govind Manriquez  1970  49 y.o.  male      Referring Provider Dr. Maranda Knapp MD   PCP Jodie Van MD    Type of service: Initial consult  Date of service: 10/15/2020      Chief Complaint   Patient presents with   • Witnessed Apnea   • Snoring   • Daytime Sleepiness   • Fatigue       History of present illness;  Thank you for asking to see Govind Manriquez, 49 y.o.  for evaluation of sleep apnea. The patient was seen today on 10/15/2020 at [facility].   Patient is been sent for evaluation of sleep apnea because of snoring, headaches and neck pain.  He also tells me that he has intermittent sore throat and dryness in the mouth.  Sometimes he has woken up in the middle of the night with heart racing up to 130 to 140 beats. 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  Now he says that he works as needed      Patient gives the following sleep history.  Sleep schedule:  Bedtime: 10 PM  Wake time: 9 AM  Normally takes about 5 minutes to fall asleep  Average hours of sleep 7  Number of naps per day none  When patient wakes up still feels tired and not rested  Snoring; yes  Witnessed apneas; yes  Have you ever awakened gasping for breath, coughing, choking: Yes, complains of dry mouth also      Past Medical History:   Diagnosis Date   • GERD (gastroesophageal reflux disease)    • Hyperlipidemia    • Migraine    • Neck pain    • Observed sleep apnea 10/15/2020       Past Surgical history   has a past surgical history that includes Knee cartilage surgery (Left).     Social history:  Shift work: No  Tobacco use: No  Alcohol use: No  Caffeinated drinks: 1  Over-the-counter sleeping aid and medications: None  Narcotic medications: None    Family Hx  Family history of sleep apnea, father had sleep apnea  Family History   Problem  "Relation Age of Onset   • Heart disease Father 56        started with CAD in 30s non-smoker but obese   • Heart failure Father    • Stroke Paternal Grandmother        Medications: reviewed    Current Outpatient Medications:   •  baclofen (LIORESAL) 10 MG tablet, Take 10 mg by mouth Daily As Needed., Disp: , Rfl:   •  dexlansoprazole (Dexilant) 60 MG capsule, Take 1 capsule by mouth Daily., Disp: 90 capsule, Rfl: 3  •  Evolocumab (Repatha) 140 MG/ML solution prefilled syringe, Inject 1 mL under the skin into the appropriate area as directed Every 14 (Fourteen) Days., Disp: 6 syringe, Rfl: 3  •  ketoconazole (NIZORAL) 2 % shampoo, Apply 1 application topically 2 (Two) Times a Week., Disp: 120 mL, Rfl: 1  •  topiramate (TOPAMAX) 25 MG capsule (sprinkle), Take 1 capsule by mouth Daily., Disp: 90 capsule, Rfl: 3  •  TREXIMET  MG per tablet, Take 1 tablet by mouth Every 2 (Two) Hours As Needed for Migraine. Take one tablet at onset of headache, Disp: 27 tablet, Rfl: 3    Review of systems:  Beckemeyer Sleepiness Scale: Total score: 3   Positive for snoring, witnessed apneas, fatigue  Negative for shortness of breath, cough, wheezing, chest pain, nausea and vomiting, palpitation, swelling of feet:    Morning headaches: Yes at least 2-3 times a week  Awaken with sore throat or dry mouth : Dry mouth  Leg jerking at night: No  Crawly feeling/urge sensation to move in the legs: No  Teeth grinding: No  Sleepwalking, nightmares, muscle weakness with laughing or anger,sleep paralysis: No  Nasal Congestion: No  Nocturia (how many times/night): 1  Memory Problem: None  Change in weight, no    Physical exam:  Vitals:    10/15/20 1454   BP: 122/84   Pulse: 72   SpO2: 97%   Weight: 85.7 kg (189 lb)   Height: 175.3 cm (69\")    Body mass index is 27.91 kg/m². Neck Circumference: 15.5 inches  HEENT: Head is atraumatic, normocephalic   Eyes:pupils are round equal and reacting to light and accommodation, conjunctiva normal  Nose:no " nasal septal defects or deviation and the nasal passages are clear, no nasal polyps,   Throat: tonsils are not enlarged, tongue normal size, oral airway Mallampati class 3  NECK: No lymphadenopathy, trachea is in the midline, thyroid not enlarged  RESPIRATORY SYSTEM: Breath sounds are equal on both sides, there are no wheezes or crackles  CARDIOVASULAR SYSTEM: Heart sounds are regular rhythm and edgar rate, there are no murmurs or thrills  ABDOMEN: Soft, no hepatosplenomegaly, no evidence of ascites  EXTREMITES: No cyanosis, clubbing or edema   NEUROLOGICAL SYSTEM: Oriented x 3, no gross neurological defects, gait normal    Medical records and labs reviewed in [facility] reviewed    Assessment and plan:  · Sleep apnea unspecified, (G47.30) Patient's symptoms and examination is consistent with sleep apnea.  I have talked to the patient about the signs and symptoms of sleep apnea and consequences of untreated sleep apnea. Discussed sleep testing.  I have placed a order in epic for a home sleep test.  Patient will have a follow-up after this sleep test is done.   · Overweight, patient's BMI is Body mass index is 27.91 kg/m².. I have discussed the relationship between weight and sleep apnea.There is direct correction between weight and severity of sleep apnea.  Weight reduction is encouraged. I have also discussed with the patient diet and exercise.  · Snoring secondary to sleep apnea  · Migraine/dry mouth all could be related to sleep apnea.      I once again thank you for asking me to see this patient in consultation and I have forwarded my opinion and treatment plan.  Please do not hesitate to call me if you have any questions.     Rosmery Luke MD  Sleep Medicine.(Board-certified)  [facility]  Medical Director for [facility]  10/15/2020 ,

## 2020-10-16 ENCOUNTER — HOSPITAL ENCOUNTER (OUTPATIENT)
Dept: CARDIOLOGY | Facility: HOSPITAL | Age: 50
Discharge: HOME | End: 2020-10-16
Admitting: INTERNAL MEDICINE

## 2020-10-16 ENCOUNTER — TELEPHONE (OUTPATIENT)
Dept: CARDIOLOGY | Facility: CLINIC | Age: 50
End: 2020-10-16

## 2020-10-16 DIAGNOSIS — R07.2 PRECORDIAL PAIN: ICD-10-CM

## 2020-10-16 DIAGNOSIS — E78.2 MIXED HYPERLIPIDEMIA: ICD-10-CM

## 2020-10-16 LAB
BH CV STRESS BP STAGE 1: NORMAL
BH CV STRESS BP STAGE 2: NORMAL
BH CV STRESS BP STAGE 3: NORMAL
BH CV STRESS BP STAGE 4: NORMAL
BH CV STRESS DURATION MIN STAGE 1: 3
BH CV STRESS DURATION MIN STAGE 2: 3
BH CV STRESS DURATION MIN STAGE 3: 3
BH CV STRESS DURATION MIN STAGE 4: 3
BH CV STRESS DURATION SEC STAGE 1: 0
BH CV STRESS DURATION SEC STAGE 2: 0
BH CV STRESS DURATION SEC STAGE 3: 0
BH CV STRESS DURATION SEC STAGE 4: 0
BH CV STRESS GRADE STAGE 1: 10
BH CV STRESS GRADE STAGE 2: 12
BH CV STRESS GRADE STAGE 3: 14
BH CV STRESS GRADE STAGE 4: 16
BH CV STRESS HR STAGE 1: 120
BH CV STRESS HR STAGE 2: 135
BH CV STRESS HR STAGE 3: 167
BH CV STRESS HR STAGE 4: 194
BH CV STRESS METS STAGE 1: 5
BH CV STRESS METS STAGE 2: 7.5
BH CV STRESS METS STAGE 3: 10
BH CV STRESS METS STAGE 4: 13.5
BH CV STRESS PROTOCOL 1: NORMAL
BH CV STRESS RECOVERY BP: NORMAL MMHG
BH CV STRESS RECOVERY HR: 118 BPM
BH CV STRESS SPEED STAGE 1: 1.7
BH CV STRESS SPEED STAGE 2: 2.5
BH CV STRESS SPEED STAGE 3: 3.4
BH CV STRESS SPEED STAGE 4: 4.2
BH CV STRESS STAGE 1: 1
BH CV STRESS STAGE 2: 2
BH CV STRESS STAGE 3: 3
BH CV STRESS STAGE 4: 4
MAXIMAL PREDICTED HEART RATE: 171 BPM
PERCENT MAX PREDICTED HR: 113.45 %
STRESS BASELINE BP: NORMAL MMHG
STRESS BASELINE HR: 88 BPM
STRESS PERCENT HR: 133 %
STRESS POST ESTIMATED WORKLOAD: 13.5 METS
STRESS POST EXERCISE DUR MIN: 12 MIN
STRESS POST EXERCISE DUR SEC: 0 SEC
STRESS POST PEAK BP: NORMAL MMHG
STRESS POST PEAK HR: 194 BPM
STRESS TARGET HR: 145 BPM

## 2020-10-16 PROCEDURE — 93016 CV STRESS TEST SUPVJ ONLY: CPT | Performed by: INTERNAL MEDICINE

## 2020-10-16 PROCEDURE — 93018 CV STRESS TEST I&R ONLY: CPT | Performed by: INTERNAL MEDICINE

## 2020-10-16 PROCEDURE — 93017 CV STRESS TEST TRACING ONLY: CPT

## 2020-10-19 ENCOUNTER — HOSPITAL ENCOUNTER (OUTPATIENT)
Dept: CT IMAGING | Facility: HOSPITAL | Age: 50
Discharge: HOME | End: 2020-10-19
Admitting: INTERNAL MEDICINE

## 2020-10-19 DIAGNOSIS — R07.2 PRECORDIAL PAIN: ICD-10-CM

## 2020-10-19 DIAGNOSIS — E78.2 MIXED HYPERLIPIDEMIA: ICD-10-CM

## 2020-10-19 DIAGNOSIS — Z82.49 FAMILY HISTORY OF CORONARY ARTERY DISEASE: ICD-10-CM

## 2020-10-19 PROCEDURE — 75571 CT HRT W/O DYE W/CA TEST: CPT

## 2020-10-20 ENCOUNTER — TELEPHONE (OUTPATIENT)
Dept: CARDIOLOGY | Facility: CLINIC | Age: 50
End: 2020-10-20

## 2020-10-20 NOTE — TELEPHONE ENCOUNTER
Notified patient of results. He verbalized understanding.    Amber Daniels, RN  Triage [facility]

## 2020-10-20 NOTE — TELEPHONE ENCOUNTER
Please call, calcium score 0, small pulmonary nodule, follow-up with Dr. Van about this, overall looks good.

## 2020-10-20 NOTE — TELEPHONE ENCOUNTER
Dr. Knapp,    Pt asked if [facility] could [facility]?    Thank you,    Amber Daniels RN  Triage [facility]

## 2020-10-20 NOTE — TELEPHONE ENCOUNTER
Notified patient of results and recommendations. He verbalized understanding.    Amber Daniels, RN  Triage [facility]

## 2020-10-21 ENCOUNTER — TELEPHONE (OUTPATIENT)
Dept: INTERNAL MEDICINE | Facility: CLINIC | Age: 50
End: 2020-10-21

## 2020-10-21 DIAGNOSIS — R91.1 PULMONARY NODULE: Primary | ICD-10-CM

## 2020-10-21 NOTE — TELEPHONE ENCOUNTER
REFERRAL ORDERED    ----- Message from Jodie Van MD sent at 10/21/2020 10:49 AM EDT -----  Is this for the nodule?  Go ahead and refer  ----- Message -----  From: Shannon Ellis MA  Sent: 10/21/2020  10:35 AM EDT  To: Jodie Van MD    RESULTS IN CHART, DONE BY CARDIOLOGY   ----- Message -----  From: Nica Zheng RegSched Rep  Sent: 10/21/2020  10:32 AM EDT  To: Shannon Ellis MA    Patient said he had a CT Cardiac test that came back stating he needs to see  a Pulmonologist. Can Dr Van put an order for a Pulmologist? He wants to see Dr Connor with [facility]

## 2020-10-30 ENCOUNTER — HOSPITAL ENCOUNTER (OUTPATIENT)
Dept: GENERAL RADIOLOGY | Facility: HOSPITAL | Age: 50
Discharge: HOME | End: 2020-10-30
Admitting: NEUROLOGICAL SURGERY

## 2020-10-30 DIAGNOSIS — M54.6 CHRONIC BILATERAL THORACIC BACK PAIN: Primary | ICD-10-CM

## 2020-10-30 DIAGNOSIS — M54.6 CHRONIC BILATERAL THORACIC BACK PAIN: ICD-10-CM

## 2020-10-30 DIAGNOSIS — G89.29 CHRONIC BILATERAL THORACIC BACK PAIN: Primary | ICD-10-CM

## 2020-10-30 DIAGNOSIS — G89.29 CHRONIC BILATERAL THORACIC BACK PAIN: ICD-10-CM

## 2020-10-30 PROCEDURE — 72072 X-RAY EXAM THORAC SPINE 3VWS: CPT

## 2020-10-30 RX ORDER — METHYLPREDNISOLONE 4 MG/1
TABLET ORAL
Qty: 1 EACH | Refills: 0 | Status: SHIPPED | OUTPATIENT
Start: 2020-10-30 | End: 2021-02-19

## 2020-10-30 NOTE — TELEPHONE ENCOUNTER
Gilmar Banegas MD Mattingly, Kara, MA             Please let this fellow know that his x-rays look okay with no evidence of fracture.  I offered to call him in a Medrol Dosepak which you can do if he wants but he did not want 1 when I talked to him.      Attempted to call the patient however he did not answer. I called him again.

## 2020-10-30 NOTE — TELEPHONE ENCOUNTER
Pt spoke with Dr. Banegas yesterday. He complained of a loud pop in his mid-back a few days ago and no has thoracic back pain.      Dr. Banegas is ordering AP/Lat thoracic xrays and a steroid dose jhoana.

## 2020-11-16 ENCOUNTER — HOSPITAL ENCOUNTER (OUTPATIENT)
Dept: SLEEP MEDICINE | Facility: HOSPITAL | Age: 50
End: 2020-11-16

## 2021-02-04 ENCOUNTER — TRANSCRIBE ORDERS (OUTPATIENT)
Dept: ADMINISTRATIVE | Facility: HOSPITAL | Age: 51
End: 2021-02-04

## 2021-02-04 DIAGNOSIS — R91.1 PULMONARY NODULE: Primary | ICD-10-CM

## 2021-02-15 ENCOUNTER — LAB (OUTPATIENT)
Dept: LAB | Facility: HOSPITAL | Age: 51
End: 2021-02-15

## 2021-02-15 DIAGNOSIS — E04.1 THYROID NODULE: ICD-10-CM

## 2021-02-15 DIAGNOSIS — E78.5 HYPERLIPIDEMIA, UNSPECIFIED HYPERLIPIDEMIA TYPE: Primary | ICD-10-CM

## 2021-02-15 LAB
ALBUMIN SERPL-MCNC: 4.6 G/DL (ref 3.5–5.2)
ALBUMIN/GLOB SERPL: 1.8 G/DL
ALP SERPL-CCNC: 57 U/L (ref 39–117)
ALT SERPL W P-5'-P-CCNC: 23 U/L (ref 1–41)
ANION GAP SERPL CALCULATED.3IONS-SCNC: 7.6 MMOL/L (ref 5–15)
AST SERPL-CCNC: 18 U/L (ref 1–40)
BASOPHILS # BLD AUTO: 0.14 10*3/MM3 (ref 0–0.2)
BASOPHILS NFR BLD AUTO: 2 % (ref 0–1.5)
BILIRUB SERPL-MCNC: 0.5 MG/DL (ref 0–1.2)
BUN SERPL-MCNC: 17 MG/DL (ref 6–20)
BUN/CREAT SERPL: 17.5 (ref 7–25)
CALCIUM SPEC-SCNC: 9.9 MG/DL (ref 8.6–10.5)
CHLORIDE SERPL-SCNC: 104 MMOL/L (ref 98–107)
CHOLEST SERPL-MCNC: 174 MG/DL (ref 0–200)
CO2 SERPL-SCNC: 28.4 MMOL/L (ref 22–29)
CREAT SERPL-MCNC: 0.97 MG/DL (ref 0.76–1.27)
DEPRECATED RDW RBC AUTO: 42.7 FL (ref 37–54)
EOSINOPHIL # BLD AUTO: 0.25 10*3/MM3 (ref 0–0.4)
EOSINOPHIL NFR BLD AUTO: 3.5 % (ref 0.3–6.2)
ERYTHROCYTE [DISTWIDTH] IN BLOOD BY AUTOMATED COUNT: 13 % (ref 12.3–15.4)
GFR SERPL CREATININE-BSD FRML MDRD: 82 ML/MIN/1.73
GLOBULIN UR ELPH-MCNC: 2.6 GM/DL
GLUCOSE SERPL-MCNC: 99 MG/DL (ref 65–99)
HCT VFR BLD AUTO: 44 % (ref 37.5–51)
HCV AB SER DONR QL: NORMAL
HDLC SERPL-MCNC: 52 MG/DL (ref 40–60)
HGB BLD-MCNC: 14.8 G/DL (ref 13–17.7)
IMM GRANULOCYTES # BLD AUTO: 0.02 10*3/MM3 (ref 0–0.05)
IMM GRANULOCYTES NFR BLD AUTO: 0.3 % (ref 0–0.5)
LDLC SERPL CALC-MCNC: 85 MG/DL (ref 0–100)
LDLC/HDLC SERPL: 1.48 {RATIO}
LYMPHOCYTES # BLD AUTO: 2.76 10*3/MM3 (ref 0.7–3.1)
LYMPHOCYTES NFR BLD AUTO: 38.7 % (ref 19.6–45.3)
MCH RBC QN AUTO: 30.5 PG (ref 26.6–33)
MCHC RBC AUTO-ENTMCNC: 33.6 G/DL (ref 31.5–35.7)
MCV RBC AUTO: 90.7 FL (ref 79–97)
MONOCYTES # BLD AUTO: 0.62 10*3/MM3 (ref 0.1–0.9)
MONOCYTES NFR BLD AUTO: 8.7 % (ref 5–12)
NEUTROPHILS NFR BLD AUTO: 3.34 10*3/MM3 (ref 1.7–7)
NEUTROPHILS NFR BLD AUTO: 46.8 % (ref 42.7–76)
NRBC BLD AUTO-RTO: 0 /100 WBC (ref 0–0.2)
PLATELET # BLD AUTO: 307 10*3/MM3 (ref 140–450)
PMV BLD AUTO: 10 FL (ref 6–12)
POTASSIUM SERPL-SCNC: 4.9 MMOL/L (ref 3.5–5.2)
PROT SERPL-MCNC: 7.2 G/DL (ref 6–8.5)
RBC # BLD AUTO: 4.85 10*6/MM3 (ref 4.14–5.8)
SODIUM SERPL-SCNC: 140 MMOL/L (ref 136–145)
TRIGL SERPL-MCNC: 225 MG/DL (ref 0–150)
TSH SERPL DL<=0.05 MIU/L-ACNC: 1.51 UIU/ML (ref 0.27–4.2)
VLDLC SERPL-MCNC: 37 MG/DL (ref 5–40)
WBC # BLD AUTO: 7.13 10*3/MM3 (ref 3.4–10.8)

## 2021-02-15 PROCEDURE — 80053 COMPREHEN METABOLIC PANEL: CPT | Performed by: INTERNAL MEDICINE

## 2021-02-15 PROCEDURE — 84443 ASSAY THYROID STIM HORMONE: CPT | Performed by: INTERNAL MEDICINE

## 2021-02-15 PROCEDURE — 85025 COMPLETE CBC W/AUTO DIFF WBC: CPT | Performed by: INTERNAL MEDICINE

## 2021-02-15 PROCEDURE — 36415 COLL VENOUS BLD VENIPUNCTURE: CPT | Performed by: INTERNAL MEDICINE

## 2021-02-15 PROCEDURE — 80061 LIPID PANEL: CPT | Performed by: INTERNAL MEDICINE

## 2021-02-15 PROCEDURE — 86803 HEPATITIS C AB TEST: CPT | Performed by: INTERNAL MEDICINE

## 2021-02-19 ENCOUNTER — OFFICE VISIT (OUTPATIENT)
Dept: INTERNAL MEDICINE | Facility: CLINIC | Age: 51
End: 2021-02-19

## 2021-02-19 VITALS
HEIGHT: 69 IN | DIASTOLIC BLOOD PRESSURE: 86 MMHG | SYSTOLIC BLOOD PRESSURE: 110 MMHG | OXYGEN SATURATION: 99 % | HEART RATE: 91 BPM | WEIGHT: 187.6 LBS | BODY MASS INDEX: 27.78 KG/M2

## 2021-02-19 DIAGNOSIS — E78.2 MIXED HYPERLIPIDEMIA: Primary | ICD-10-CM

## 2021-02-19 DIAGNOSIS — G43.009 MIGRAINE WITHOUT AURA AND WITHOUT STATUS MIGRAINOSUS, NOT INTRACTABLE: ICD-10-CM

## 2021-02-19 DIAGNOSIS — K21.00 GASTROESOPHAGEAL REFLUX DISEASE WITH ESOPHAGITIS, UNSPECIFIED WHETHER HEMORRHAGE: ICD-10-CM

## 2021-02-19 DIAGNOSIS — G44.209 TENSION HEADACHE: ICD-10-CM

## 2021-02-19 PROBLEM — G47.30 OBSERVED SLEEP APNEA: Status: RESOLVED | Noted: 2020-10-15 | Resolved: 2021-02-19

## 2021-02-19 PROCEDURE — 99214 OFFICE O/P EST MOD 30 MIN: CPT | Performed by: INTERNAL MEDICINE

## 2021-02-19 RX ORDER — GALCANEZUMAB 120 MG/ML
120 INJECTION, SOLUTION SUBCUTANEOUS ONCE
Qty: 3 PEN | Refills: 3 | Status: SHIPPED | OUTPATIENT
Start: 2021-02-19 | End: 2021-02-19

## 2021-02-19 RX ORDER — DEXLANSOPRAZOLE 60 MG/1
1 CAPSULE, DELAYED RELEASE ORAL DAILY
Qty: 90 CAPSULE | Refills: 3 | Status: SHIPPED | OUTPATIENT
Start: 2021-02-19 | End: 2021-08-25

## 2021-02-19 RX ORDER — BACLOFEN 10 MG/1
10 TABLET ORAL 2 TIMES DAILY
Qty: 30 TABLET | Refills: 1 | Status: SHIPPED | OUTPATIENT
Start: 2021-02-19 | End: 2021-07-14

## 2021-02-19 RX ORDER — TOPIRAMATE 25 MG/1
25 CAPSULE, COATED PELLETS ORAL DAILY
Qty: 90 CAPSULE | Refills: 3 | Status: SHIPPED | OUTPATIENT
Start: 2021-02-19 | End: 2022-09-08 | Stop reason: SDUPTHER

## 2021-02-19 RX ORDER — SUMATRIPTAN SUCC/NAPROXEN SOD 85MG-500MG
1 TABLET ORAL
Qty: 27 TABLET | Refills: 3 | Status: SHIPPED | OUTPATIENT
Start: 2021-02-19 | End: 2021-08-25

## 2021-02-19 NOTE — PROGRESS NOTES
Subjective   Govind Manriquez is a 50 y.o. male here today to f/u on migraines and hyperlipidemia.      History of Present Illness   He has migraine HA occas   He does not take the topamax reg  occas baclofen  Pt has been compliant with meds for GERD.  No sx as long as pt takes medicine as prescribed.  No epigastric pain or reflux sx  Ok with repatha  No SE    The following portions of the patient's history were reviewed and updated as appropriate: allergies, current medications, past medical history, past social history and problem list.    Review of Systems   All other systems reviewed and are negative.      Objective   Physical Exam  Vitals signs reviewed.   Constitutional:       Appearance: He is well-developed.   HENT:      Head: Normocephalic and atraumatic.      Right Ear: External ear normal.      Left Ear: External ear normal.   Eyes:      Conjunctiva/sclera: Conjunctivae normal.      Pupils: Pupils are equal, round, and reactive to light.   Neck:      Musculoskeletal: Normal range of motion.      Thyroid: No thyromegaly.      Trachea: No tracheal deviation.   Cardiovascular:      Rate and Rhythm: Normal rate and regular rhythm.      Heart sounds: Normal heart sounds.   Pulmonary:      Effort: Pulmonary effort is normal.      Breath sounds: Normal breath sounds.   Abdominal:      General: Bowel sounds are normal. There is no distension.      Palpations: Abdomen is soft.      Tenderness: There is no abdominal tenderness.   Musculoskeletal: Normal range of motion.         General: No deformity.   Skin:     General: Skin is warm and dry.   Neurological:      Mental Status: He is alert and oriented to person, place, and time.   Psychiatric:         Behavior: Behavior normal.         Thought Content: Thought content normal.         Judgment: Judgment normal.         Vitals:    02/19/21 0723   BP: 110/86   Pulse: 91   SpO2: 99%     Body mass index is 27.7 kg/m².    Orders Only on 02/15/2021   Component Date  Value Ref Range Status   • Glucose 02/15/2021 99  65 - 99 mg/dL Final   • BUN 02/15/2021 17  6 - 20 mg/dL Final   • Creatinine 02/15/2021 0.97  0.76 - 1.27 mg/dL Final   • Sodium 02/15/2021 140  136 - 145 mmol/L Final   • Potassium 02/15/2021 4.9  3.5 - 5.2 mmol/L Final   • Chloride 02/15/2021 104  98 - 107 mmol/L Final   • CO2 02/15/2021 28.4  22.0 - 29.0 mmol/L Final   • Calcium 02/15/2021 9.9  8.6 - 10.5 mg/dL Final   • Total Protein 02/15/2021 7.2  6.0 - 8.5 g/dL Final   • Albumin 02/15/2021 4.60  3.50 - 5.20 g/dL Final   • ALT (SGPT) 02/15/2021 23  1 - 41 U/L Final   • AST (SGOT) 02/15/2021 18  1 - 40 U/L Final   • Alkaline Phosphatase 02/15/2021 57  39 - 117 U/L Final   • Total Bilirubin 02/15/2021 0.5  0.0 - 1.2 mg/dL Final   • eGFR Non  Amer 02/15/2021 82  >60 mL/min/1.73 Final   • Globulin 02/15/2021 2.6  gm/dL Final   • A/G Ratio 02/15/2021 1.8  g/dL Final   • BUN/Creatinine Ratio 02/15/2021 17.5  7.0 - 25.0 Final   • Anion Gap 02/15/2021 7.6  5.0 - 15.0 mmol/L Final   • TSH 02/15/2021 1.510  0.270 - 4.200 uIU/mL Final   • Total Cholesterol 02/15/2021 174  0 - 200 mg/dL Final   • Triglycerides 02/15/2021 225* 0 - 150 mg/dL Final   • HDL Cholesterol 02/15/2021 52  40 - 60 mg/dL Final   • LDL Cholesterol  02/15/2021 85  0 - 100 mg/dL Final   • VLDL Cholesterol 02/15/2021 37  5 - 40 mg/dL Final   • LDL/HDL Ratio 02/15/2021 1.48   Final   • Hepatitis C Ab 02/15/2021 Non-Reactive  Non-Reactive Final   • WBC 02/15/2021 7.13  3.40 - 10.80 10*3/mm3 Final   • RBC 02/15/2021 4.85  4.14 - 5.80 10*6/mm3 Final   • Hemoglobin 02/15/2021 14.8  13.0 - 17.7 g/dL Final   • Hematocrit 02/15/2021 44.0  37.5 - 51.0 % Final   • MCV 02/15/2021 90.7  79.0 - 97.0 fL Final   • MCH 02/15/2021 30.5  26.6 - 33.0 pg Final   • MCHC 02/15/2021 33.6  31.5 - 35.7 g/dL Final   • RDW 02/15/2021 13.0  12.3 - 15.4 % Final   • RDW-SD 02/15/2021 42.7  37.0 - 54.0 fl Final   • MPV 02/15/2021 10.0  6.0 - 12.0 fL Final   • Platelets  02/15/2021 307  140 - 450 10*3/mm3 Final   • Neutrophil % 02/15/2021 46.8  42.7 - 76.0 % Final   • Lymphocyte % 02/15/2021 38.7  19.6 - 45.3 % Final   • Monocyte % 02/15/2021 8.7  5.0 - 12.0 % Final   • Eosinophil % 02/15/2021 3.5  0.3 - 6.2 % Final   • Basophil % 02/15/2021 2.0* 0.0 - 1.5 % Final   • Immature Grans % 02/15/2021 0.3  0.0 - 0.5 % Final   • Neutrophils, Absolute 02/15/2021 3.34  1.70 - 7.00 10*3/mm3 Final   • Lymphocytes, Absolute 02/15/2021 2.76  0.70 - 3.10 10*3/mm3 Final   • Monocytes, Absolute 02/15/2021 0.62  0.10 - 0.90 10*3/mm3 Final   • Eosinophils, Absolute 02/15/2021 0.25  0.00 - 0.40 10*3/mm3 Final   • Basophils, Absolute 02/15/2021 0.14  0.00 - 0.20 10*3/mm3 Final   • Immature Grans, Absolute 02/15/2021 0.02  0.00 - 0.05 10*3/mm3 Final   • nRBC 02/15/2021 0.0  0.0 - 0.2 /100 WBC Final       Current Outpatient Medications:   •  baclofen (LIORESAL) 10 MG tablet, Take 10 mg by mouth Daily As Needed., Disp: , Rfl:   •  dexlansoprazole (Dexilant) 60 MG capsule, Take 1 capsule by mouth Daily., Disp: 90 capsule, Rfl: 3  •  Evolocumab (Repatha) 140 MG/ML solution prefilled syringe, Inject 1 mL under the skin into the appropriate area as directed Every 14 (Fourteen) Days., Disp: 6 syringe, Rfl: 3  •  topiramate (TOPAMAX) 25 MG capsule (sprinkle), Take 1 capsule by mouth Daily., Disp: 90 capsule, Rfl: 3  •  TREXIMET  MG per tablet, Take 1 tablet by mouth Every 2 (Two) Hours As Needed for Migraine. Take one tablet at onset of headache, Disp: 27 tablet, Rfl: 3       Assessment/Plan   Diagnoses and all orders for this visit:    1. Mixed hyperlipidemia (Primary)    2. Tension headache    3. Gastroesophageal reflux disease with esophagitis, unspecified whether hemorrhage    4. Migraine without aura and without status migrainosus, not intractable      1.  HPL- ok with repatha  2.  Migraine and tension HA-  He does see neuro for this.  He does not like to take his meds reg  We will try emgality   3.   GERD-Ok with prn use of dexilant

## 2021-03-26 ENCOUNTER — BULK ORDERING (OUTPATIENT)
Dept: CASE MANAGEMENT | Facility: OTHER | Age: 51
End: 2021-03-26

## 2021-03-26 DIAGNOSIS — Z23 IMMUNIZATION DUE: ICD-10-CM

## 2021-04-02 ENCOUNTER — TELEPHONE (OUTPATIENT)
Dept: NEUROSURGERY | Facility: CLINIC | Age: 51
End: 2021-04-02

## 2021-04-02 DIAGNOSIS — M54.6 CHRONIC BILATERAL THORACIC BACK PAIN: Primary | ICD-10-CM

## 2021-04-02 DIAGNOSIS — G89.29 CHRONIC BILATERAL THORACIC BACK PAIN: Primary | ICD-10-CM

## 2021-04-02 NOTE — TELEPHONE ENCOUNTER
Pt called: He last seen Dr. Banegas 2/5/19 and is having neck pain and can barely move his neck. He would like to know if Dr. Banegas will put in an MRI order for his cervical and thoracic. 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 Please advise! Thanks!      Pt contact: [phone]  Best time to call: anytime

## 2021-04-02 NOTE — TELEPHONE ENCOUNTER
S/W patient an informed him of his MRIs at [facility] on 04.25.2021 and his f/u televist the following week with Dr. Banegas

## 2021-04-02 NOTE — TELEPHONE ENCOUNTER
Okay with me to order an MRI of the cervical and thoracic spine.  We can just schedule him for a telephone visit or bring him in on Monday depending on when we can get the MRI done.

## 2021-04-19 ENCOUNTER — TELEPHONE (OUTPATIENT)
Dept: NEUROSURGERY | Facility: CLINIC | Age: 51
End: 2021-04-19

## 2021-04-19 DIAGNOSIS — M54.6 CHRONIC BILATERAL THORACIC BACK PAIN: Primary | ICD-10-CM

## 2021-04-19 DIAGNOSIS — G89.29 CHRONIC BILATERAL THORACIC BACK PAIN: Primary | ICD-10-CM

## 2021-04-19 NOTE — TELEPHONE ENCOUNTER
S/W patient and he wants his MRI's W WO contrast per Dr. Banegas and patient also wants them reschedule for a different day.  Informed patient that we will take care of this for him

## 2021-04-25 ENCOUNTER — APPOINTMENT (OUTPATIENT)
Dept: MRI IMAGING | Facility: HOSPITAL | Age: 51
End: 2021-04-25

## 2021-05-07 ENCOUNTER — TELEPHONE (OUTPATIENT)
Dept: INTERNAL MEDICINE | Facility: CLINIC | Age: 51
End: 2021-05-07

## 2021-05-07 NOTE — TELEPHONE ENCOUNTER
----- Message from Jodie Van MD sent at 5/7/2021  9:58 AM EDT -----  Praluent is to be used with a statin and pt is statin intolerant  ----- Message -----  From: Shannon Ellis MA  Sent: 5/6/2021  11:12 AM EDT  To: Jodie Van MD    Trying to do a PA for the repatha. He has been on it a year. But insurance recommends praluent. And if we choose repatha they want to know why he cant use praluent and chart notes supporting. What should be done?

## 2021-05-11 ENCOUNTER — HOSPITAL ENCOUNTER (OUTPATIENT)
Dept: MRI IMAGING | Facility: HOSPITAL | Age: 51
Discharge: HOME | End: 2021-05-11

## 2021-05-11 DIAGNOSIS — G89.29 CHRONIC BILATERAL THORACIC BACK PAIN: ICD-10-CM

## 2021-05-11 DIAGNOSIS — M54.6 CHRONIC BILATERAL THORACIC BACK PAIN: ICD-10-CM

## 2021-05-11 PROCEDURE — 72156 MRI NECK SPINE W/O & W/DYE: CPT

## 2021-05-11 PROCEDURE — A9577 INJ MULTIHANCE: HCPCS | Performed by: NEUROLOGICAL SURGERY

## 2021-05-11 PROCEDURE — 0 GADOBENATE DIMEGLUMINE 529 MG/ML SOLUTION: Performed by: NEUROLOGICAL SURGERY

## 2021-05-11 PROCEDURE — 72157 MRI CHEST SPINE W/O & W/DYE: CPT

## 2021-05-11 RX ADMIN — GADOBENATE DIMEGLUMINE 17 ML: 529 INJECTION, SOLUTION INTRAVENOUS at 21:57

## 2021-05-12 ENCOUNTER — TELEPHONE (OUTPATIENT)
Dept: INTERNAL MEDICINE | Facility: CLINIC | Age: 51
End: 2021-05-12

## 2021-05-12 RX ORDER — ALIROCUMAB 75 MG/ML
75 INJECTION, SOLUTION SUBCUTANEOUS
Qty: 2 PEN | Refills: 5 | Status: SHIPPED | OUTPATIENT
Start: 2021-05-12 | End: 2021-08-25 | Stop reason: SDUPTHER

## 2021-05-12 NOTE — TELEPHONE ENCOUNTER
I have sent the praluent     ----- Message from Shannon Ellis MA sent at 5/12/2021  7:52 AM EDT -----  Pt insurance will not cover the repatha. They want him to try and fail praluent first

## 2021-05-19 NOTE — PROGRESS NOTES
Subjective   Patient ID: Govind Manriquez is a 50 y.o. male is here today vis telephone for follow-up with a new MRI Thoracic/Cervical spine that was ordered on 04.19.2021.    You have chosen to receive care through a telephone visit. Do you consent to use a telephone visit for your medical care today? Yes    We had a telephone visit today.  The patient was at home and I was in the office.  We talked for 5 minutes.    History of Present Illness    This patient has been having severe pain primarily in his neck and also headaches.  He has a knot on the back of his neck and in his upper trapezius area on the right side which he feels gets bigger and get smaller.  He does not have a lot of radiation into his arms.    The following portions of the patient's history were reviewed and updated as appropriate: allergies, current medications, past family history, past medical history, past social history, past surgical history and problem list.    Review of Systems   Constitutional: Negative for chills and fever.   HENT: Negative for congestion.    Genitourinary: Negative for difficulty urinating and dysuria.   Musculoskeletal: Positive for back pain and neck pain. Negative for neck stiffness.   Neurological: Positive for headaches.       I have reviewed the review of systems as documented by my MA.      Objective       Physical Exam  Neurological:      Mental Status: He is alert and oriented to person, place, and time.       Neurologic Exam     Mental Status   Oriented to person, place, and time.           Assessment/Plan   Independent Review of Radiographic Studies:      I personally reviewed the images from the following studies.    I reviewed an MRI of his cervical and his thoracic spine done on May 11 of this year.  The cervical MRI does show a little degenerative changes C3-4 with maybe a little foraminal stenosis but all the other levels look okay.  I do not see any type of abnormality in the subcutaneous tissue on  the right side.  The MRI of the thoracic spine looks okay as well.  There is a hemangioma in T10 but it is unchanged from March 2019.  This would mean that it is of no significance.    Medical Decision Making:      I told the patient about the imaging.  I told him that from my point of view I do not think there is any surgery that we can do.  I suggested that he follow-up with his pain management doctor.  In the meantime we will check some inflammatory labs.    There are no diagnoses linked to this encounter.  No follow-ups on file.

## 2021-05-20 ENCOUNTER — OFFICE VISIT (OUTPATIENT)
Dept: NEUROSURGERY | Facility: CLINIC | Age: 51
End: 2021-05-20

## 2021-05-20 DIAGNOSIS — M54.2 NECK PAIN: Primary | ICD-10-CM

## 2021-05-20 PROCEDURE — 99441 PR PHYS/QHP TELEPHONE EVALUATION 5-10 MIN: CPT | Performed by: NEUROLOGICAL SURGERY

## 2021-05-20 RX ORDER — GALCANEZUMAB 120 MG/ML
INJECTION, SOLUTION SUBCUTANEOUS
COMMUNITY
Start: 2021-05-06 | End: 2021-08-25

## 2021-05-24 ENCOUNTER — LAB (OUTPATIENT)
Dept: LAB | Facility: HOSPITAL | Age: 51
End: 2021-05-24

## 2021-05-24 DIAGNOSIS — M54.2 NECK PAIN: ICD-10-CM

## 2021-05-24 LAB
BASOPHILS # BLD AUTO: 0.11 10*3/MM3 (ref 0–0.2)
BASOPHILS NFR BLD AUTO: 1.5 % (ref 0–1.5)
CHROMATIN AB SERPL-ACNC: <10 IU/ML (ref 0–14)
CRP SERPL-MCNC: <0.3 MG/DL (ref 0–0.5)
DEPRECATED RDW RBC AUTO: 46.6 FL (ref 37–54)
EOSINOPHIL # BLD AUTO: 0.35 10*3/MM3 (ref 0–0.4)
EOSINOPHIL NFR BLD AUTO: 4.9 % (ref 0.3–6.2)
ERYTHROCYTE [DISTWIDTH] IN BLOOD BY AUTOMATED COUNT: 13.3 % (ref 12.3–15.4)
ERYTHROCYTE [SEDIMENTATION RATE] IN BLOOD: 2 MM/HR (ref 0–15)
HCT VFR BLD AUTO: 44.4 % (ref 37.5–51)
HGB BLD-MCNC: 14.5 G/DL (ref 13–17.7)
IMM GRANULOCYTES # BLD AUTO: 0.04 10*3/MM3 (ref 0–0.05)
IMM GRANULOCYTES NFR BLD AUTO: 0.6 % (ref 0–0.5)
LYMPHOCYTES # BLD AUTO: 2.62 10*3/MM3 (ref 0.7–3.1)
LYMPHOCYTES NFR BLD AUTO: 36.3 % (ref 19.6–45.3)
MCH RBC QN AUTO: 30.8 PG (ref 26.6–33)
MCHC RBC AUTO-ENTMCNC: 32.7 G/DL (ref 31.5–35.7)
MCV RBC AUTO: 94.3 FL (ref 79–97)
MONOCYTES # BLD AUTO: 0.65 10*3/MM3 (ref 0.1–0.9)
MONOCYTES NFR BLD AUTO: 9 % (ref 5–12)
NEUTROPHILS NFR BLD AUTO: 3.44 10*3/MM3 (ref 1.7–7)
NEUTROPHILS NFR BLD AUTO: 47.7 % (ref 42.7–76)
NRBC BLD AUTO-RTO: 0 /100 WBC (ref 0–0.2)
PLATELET # BLD AUTO: 277 10*3/MM3 (ref 140–450)
PMV BLD AUTO: 10 FL (ref 6–12)
RBC # BLD AUTO: 4.71 10*6/MM3 (ref 4.14–5.8)
WBC # BLD AUTO: 7.21 10*3/MM3 (ref 3.4–10.8)

## 2021-05-24 PROCEDURE — 85025 COMPLETE CBC W/AUTO DIFF WBC: CPT

## 2021-05-24 PROCEDURE — 86431 RHEUMATOID FACTOR QUANT: CPT

## 2021-05-24 PROCEDURE — 86140 C-REACTIVE PROTEIN: CPT

## 2021-05-24 PROCEDURE — 85652 RBC SED RATE AUTOMATED: CPT

## 2021-05-24 PROCEDURE — 36415 COLL VENOUS BLD VENIPUNCTURE: CPT

## 2021-05-24 PROCEDURE — 86038 ANTINUCLEAR ANTIBODIES: CPT

## 2021-05-25 ENCOUNTER — TELEPHONE (OUTPATIENT)
Dept: NEUROSURGERY | Facility: CLINIC | Age: 51
End: 2021-05-25

## 2021-05-25 LAB — ANA SER QL: NEGATIVE

## 2021-05-25 NOTE — TELEPHONE ENCOUNTER
I talked to this patient today on the phone.  His lab work is all normal.  I explained that this does not mean he does not have arthritis just means he does not have rheumatoid or some other aggressive form of arthritis.  I told him to follow-up with his pain management doctor and call me if anything gets worse in the future.  He agreed.

## 2021-07-14 ENCOUNTER — PROCEDURE VISIT (OUTPATIENT)
Dept: NEUROLOGY | Facility: CLINIC | Age: 51
End: 2021-07-14

## 2021-07-14 VITALS
WEIGHT: 188 LBS | SYSTOLIC BLOOD PRESSURE: 138 MMHG | DIASTOLIC BLOOD PRESSURE: 94 MMHG | HEIGHT: 69 IN | OXYGEN SATURATION: 98 % | HEART RATE: 117 BPM | BODY MASS INDEX: 27.85 KG/M2

## 2021-07-14 DIAGNOSIS — G44.86 CERVICOGENIC HEADACHE: ICD-10-CM

## 2021-07-14 DIAGNOSIS — M54.81 OCCIPITAL NEURALGIA OF RIGHT SIDE: ICD-10-CM

## 2021-07-14 DIAGNOSIS — M54.81 BILATERAL OCCIPITAL NEURALGIA: Primary | ICD-10-CM

## 2021-07-14 PROCEDURE — 64405 NJX AA&/STRD GR OCPL NRV: CPT | Performed by: NURSE PRACTITIONER

## 2021-07-14 RX ORDER — LIDOCAINE HYDROCHLORIDE 10 MG/ML
5 INJECTION, SOLUTION INFILTRATION; PERINEURAL ONCE
Status: COMPLETED | OUTPATIENT
Start: 2021-07-14 | End: 2021-07-14

## 2021-07-14 RX ORDER — DEXAMETHASONE SODIUM PHOSPHATE 4 MG/ML
8 INJECTION, SOLUTION INTRA-ARTICULAR; INTRALESIONAL; INTRAMUSCULAR; INTRAVENOUS; SOFT TISSUE ONCE
Status: COMPLETED | OUTPATIENT
Start: 2021-07-14 | End: 2021-07-14

## 2021-07-14 RX ORDER — BACLOFEN 10 MG/1
10 TABLET ORAL 3 TIMES DAILY PRN
Qty: 90 TABLET | Refills: 0 | Status: SHIPPED | OUTPATIENT
Start: 2021-07-14 | End: 2021-08-10

## 2021-07-14 RX ADMIN — DEXAMETHASONE SODIUM PHOSPHATE 8 MG: 4 INJECTION, SOLUTION INTRA-ARTICULAR; INTRALESIONAL; INTRAMUSCULAR; INTRAVENOUS; SOFT TISSUE at 13:27

## 2021-07-14 RX ADMIN — LIDOCAINE HYDROCHLORIDE 5 ML: 10 INJECTION, SOLUTION INFILTRATION; PERINEURAL at 13:28

## 2021-07-14 NOTE — PROGRESS NOTES
Procedure   Procedures   Patient with neck pain/tension/spasm; right greater than left requesting to try occipital nerve block   Indication for Procedure: right and left Occipital Neuralgia: Right greater than left  Procedure:  right and left Occipital Nerve Block    Risks and explained to the patient including the risk of allergic reaction, bleeding, bruising, local infection, and localized pain. Patient expressed understanding and both verbal and written consent was obtained.    Time out performed.    right and left occipital nerve root identified utilizing palpation techniques.      3cc of of the following medications were administered utilizing a 3cc syringe and a 25 gauge needle surrounding the nerve root in a 360 degree fashion.  The mixture contained:  2ml of 1% Lidocaine (per side)   1 ml.of 4 mg dexamethasone (per side)     The patient tolerated the procedure well with no complications and no blood loss.    The patient is to follow up for repeat injection in in 4 weeks     Plan:  · Repeat occipital nerve block in 4 weeks if desired  · Rx alternatives topical to a pain cream  · Recommend baclofen 10 mg every 8 hours as needed for muscular head and neck tension; patient currently only taking 1 every couple of days; recommended more persistent use nightly due to teeth grinding and neck spasm-discussed this will be short-term use not a long-term regimen  · Consider referral back to pain management for trigger point injection  · Patient discontinued Emgality as he did not feel this was offering him any relief  · Declined sample of Ubrelvy and Nurtec at this time as Emgality did not offer him any relief  · Patient reports he still has leftover Treximet which has offered him some relief in the past which I feel is more specific to the naproxen not so much the triptan  · Started on Topamax 25 mg by PCP: Continue for now  · Recommend consideration again of Botox as previously recommended  · Patient has previously  tried amitriptyline, nortriptyline, diclofenac, Zanaflex without significant improvement  · Sprix ordered; prescription never filled due to insurance rejection      Ashanti Manuel, APRN

## 2021-07-15 ENCOUNTER — TELEPHONE (OUTPATIENT)
Dept: NEUROLOGY | Facility: CLINIC | Age: 51
End: 2021-07-15

## 2021-07-15 NOTE — TELEPHONE ENCOUNTER
Called patient to evaluate response to ONB that was administered in the office on 7/14/21.  Patient states that he is fine today.  Purchased a bite guard and cervical neck guard yesterday as well.  No new complaints today. Patient will call if any issues arise.

## 2021-08-10 DIAGNOSIS — G44.86 CERVICOGENIC HEADACHE: ICD-10-CM

## 2021-08-10 RX ORDER — BACLOFEN 10 MG/1
10 TABLET ORAL 3 TIMES DAILY PRN
Qty: 90 TABLET | Refills: 0 | Status: SHIPPED | OUTPATIENT
Start: 2021-08-10 | End: 2021-09-13

## 2021-08-25 ENCOUNTER — OFFICE VISIT (OUTPATIENT)
Dept: INTERNAL MEDICINE | Facility: CLINIC | Age: 51
End: 2021-08-25

## 2021-08-25 VITALS
HEART RATE: 88 BPM | SYSTOLIC BLOOD PRESSURE: 122 MMHG | OXYGEN SATURATION: 99 % | BODY MASS INDEX: 27.8 KG/M2 | WEIGHT: 187.7 LBS | DIASTOLIC BLOOD PRESSURE: 84 MMHG | TEMPERATURE: 97.7 F | HEIGHT: 69 IN

## 2021-08-25 DIAGNOSIS — E78.2 MIXED HYPERLIPIDEMIA: ICD-10-CM

## 2021-08-25 DIAGNOSIS — Z00.00 HEALTH CARE MAINTENANCE: Primary | ICD-10-CM

## 2021-08-25 DIAGNOSIS — G43.009 MIGRAINE WITHOUT AURA AND WITHOUT STATUS MIGRAINOSUS, NOT INTRACTABLE: ICD-10-CM

## 2021-08-25 PROCEDURE — 99396 PREV VISIT EST AGE 40-64: CPT | Performed by: INTERNAL MEDICINE

## 2021-08-25 RX ORDER — SUMATRIPTAN 100 MG/1
TABLET, FILM COATED ORAL
Qty: 27 TABLET | Refills: 3 | Status: SHIPPED | OUTPATIENT
Start: 2021-08-25 | End: 2022-05-18 | Stop reason: SDUPTHER

## 2021-08-25 RX ORDER — ALIROCUMAB 75 MG/ML
75 INJECTION, SOLUTION SUBCUTANEOUS
Qty: 6 PEN | Refills: 3 | Status: SHIPPED | OUTPATIENT
Start: 2021-08-25 | End: 2022-06-14

## 2021-08-25 NOTE — PROGRESS NOTES
"Subjective   Govind Manriquez is a 50 y.o. male and is here for a comprehensive physical exam. The patient reports problems - Headache.    Doing well with ha occas imitrex needed but not ofter  emgality did help      Do you take any herbs or supplements that were not prescribed by a doctor?       Social History: no tob   Social History     Socioeconomic History   • Marital status:      Spouse name: Natasha Manriquez   • Number of children: 1   • Years of education: Not on file   • Highest education level: Not on file   Tobacco Use   • Smoking status: Never Smoker   • Smokeless tobacco: Never Used   Substance and Sexual Activity   • Alcohol use: No   • Drug use: No   • Sexual activity: Defer       Family History:   Family History   Problem Relation Age of Onset   • Heart disease Father 56        started with CAD in 30s non-smoker but obese   • Heart failure Father    • Stroke Paternal Grandmother        Past Medical History:   Past Medical History:   Diagnosis Date   • GERD (gastroesophageal reflux disease)    • Hyperlipidemia    • Migraine    • Neck pain    • Observed sleep apnea 10/15/2020           Review of Systems    A comprehensive review of systems was negative.    Objective   /84 (BP Location: Left arm, Patient Position: Sitting, Cuff Size: Adult)   Pulse 88   Temp 97.7 °F (36.5 °C)   Ht 175.3 cm (69\")   Wt 85.1 kg (187 lb 11.2 oz)   SpO2 99%   BMI 27.72 kg/m²     General Appearance:    Alert, cooperative, no distress, appears stated age   Head:    Normocephalic, without obvious abnormality, atraumatic   Eyes:    PERRL, conjunctiva/corneas clear, EOM's intact, fundi     benign, both eyes        Ears:    Normal TM's and external ear canals, both ears   Nose:   Nares normal, septum midline, mucosa normal, no drainage    or sinus tenderness   Throat:   Lips, mucosa, and tongue normal; teeth and gums normal   Neck:   Supple, symmetrical, trachea midline, no adenopathy;        thyroid:  No " enlargement/tenderness/nodules; no carotid    bruit or JVD   Back:     Symmetric, no curvature, ROM normal, no CVA tenderness   Lungs:     Clear to auscultation bilaterally, respirations unlabored   Chest wall:    No tenderness or deformity   Heart:    Regular rate and rhythm, S1 and S2 normal, no murmur, rub   or gallop   Abdomen:     Soft, non-tender, bowel sounds active all four quadrants,     no masses, no organomegaly           Extremities:   Extremities normal, atraumatic, no cyanosis or edema   Pulses:   2+ and symmetric all extremities   Skin:   Skin color, texture, turgor normal, no rashes or lesions   Lymph nodes:   Cervical, supraclavicular, and axillary nodes normal   Neurologic:   CNII-XII intact. Normal strength, sensation and reflexes       throughout       Vitals:    08/25/21 0718   BP: 122/84   Pulse: 88   Temp: 97.7 °F (36.5 °C)   SpO2: 99%     Body mass index is 27.72 kg/m².      Medications:   Current Outpatient Medications:   •  baclofen (LIORESAL) 10 MG tablet, TAKE 1 TABLET BY MOUTH 3 (THREE) TIMES A DAY AS NEEDED FOR MUSCLE SPASMS., Disp: 90 tablet, Rfl: 0  •  dexlansoprazole (Dexilant) 60 MG capsule, Take 1 capsule by mouth Daily., Disp: 90 capsule, Rfl: 3  •  topiramate (TOPAMAX) 25 MG capsule (sprinkle), Take 1 capsule by mouth Daily., Disp: 90 capsule, Rfl: 3  •  Alirocumab (Praluent) 75 MG/ML solution auto-injector, Inject 75 mg under the skin into the appropriate area as directed Every 14 (Fourteen) Days., Disp: 2 pen, Rfl: 5  •  Treximet  MG per tablet, Take 1 tablet by mouth Every 2 (Two) Hours As Needed for Migraine. Take one tablet at onset of headache, Disp: 27 tablet, Rfl: 3       Assessment/Plan   Healthy male exam.      1. Healthcare Maintenance:  2. Patient Counseling:  --Nutrition: Stressed importance of moderation in sodium/caffeine intake, saturated fat and cholesterol, caloric balance, sufficient intake of fresh fruits, vegetables, fiber, calcium and vit  D  --Exercise: he is doing some reg exercise  --Substance Abuse: no tob no etoh  --Dental health: he does go to the dentist reg  --Immunizations reviewed.discuss covid he will get vaccine  --Discussed benefits of screening colonoscopy utd  3.  HA- refill imitrex occa topamax

## 2021-09-11 DIAGNOSIS — G44.86 CERVICOGENIC HEADACHE: ICD-10-CM

## 2021-09-13 RX ORDER — BACLOFEN 10 MG/1
10 TABLET ORAL 3 TIMES DAILY PRN
Qty: 90 TABLET | Refills: 0 | Status: SHIPPED | OUTPATIENT
Start: 2021-09-13 | End: 2021-10-12

## 2021-10-12 DIAGNOSIS — G44.86 CERVICOGENIC HEADACHE: ICD-10-CM

## 2021-10-12 RX ORDER — BACLOFEN 10 MG/1
10 TABLET ORAL 3 TIMES DAILY PRN
Qty: 90 TABLET | Refills: 0 | Status: SHIPPED | OUTPATIENT
Start: 2021-10-12 | End: 2021-11-11

## 2021-11-11 DIAGNOSIS — G44.86 CERVICOGENIC HEADACHE: ICD-10-CM

## 2021-11-11 RX ORDER — BACLOFEN 10 MG/1
TABLET ORAL
Qty: 90 TABLET | Refills: 0 | Status: SHIPPED | OUTPATIENT
Start: 2021-11-11 | End: 2021-12-09

## 2021-12-09 DIAGNOSIS — G44.86 CERVICOGENIC HEADACHE: ICD-10-CM

## 2021-12-09 RX ORDER — BACLOFEN 10 MG/1
TABLET ORAL
Qty: 90 TABLET | Refills: 0 | Status: SHIPPED | OUTPATIENT
Start: 2021-12-09 | End: 2022-01-06

## 2022-01-06 DIAGNOSIS — G44.86 CERVICOGENIC HEADACHE: ICD-10-CM

## 2022-01-06 RX ORDER — BACLOFEN 10 MG/1
TABLET ORAL
Qty: 90 TABLET | Refills: 0 | Status: SHIPPED | OUTPATIENT
Start: 2022-01-06 | End: 2022-02-01

## 2022-01-26 ENCOUNTER — HOSPITAL ENCOUNTER (OUTPATIENT)
Dept: CT IMAGING | Facility: HOSPITAL | Age: 52
Discharge: HOME | End: 2022-01-26
Admitting: INTERNAL MEDICINE

## 2022-01-26 DIAGNOSIS — R91.1 PULMONARY NODULE: ICD-10-CM

## 2022-01-26 PROCEDURE — 71250 CT THORAX DX C-: CPT

## 2022-02-01 DIAGNOSIS — G44.86 CERVICOGENIC HEADACHE: ICD-10-CM

## 2022-02-01 RX ORDER — BACLOFEN 10 MG/1
TABLET ORAL
Qty: 90 TABLET | Refills: 0 | Status: SHIPPED | OUTPATIENT
Start: 2022-02-01 | End: 2022-03-01

## 2022-02-22 DIAGNOSIS — R73.09 ELEVATED GLUCOSE: ICD-10-CM

## 2022-02-22 DIAGNOSIS — E78.2 MIXED HYPERLIPIDEMIA: Primary | ICD-10-CM

## 2022-02-24 LAB
ALBUMIN SERPL-MCNC: 4.7 G/DL (ref 3.8–4.9)
ALBUMIN/GLOB SERPL: 2 {RATIO} (ref 1.2–2.2)
ALP SERPL-CCNC: 63 IU/L (ref 44–121)
ALT SERPL-CCNC: 26 IU/L (ref 0–44)
AST SERPL-CCNC: 22 IU/L (ref 0–40)
BASOPHILS # BLD AUTO: 0.1 X10E3/UL (ref 0–0.2)
BASOPHILS NFR BLD AUTO: 2 %
BILIRUB SERPL-MCNC: 0.3 MG/DL (ref 0–1.2)
BUN SERPL-MCNC: 20 MG/DL (ref 6–24)
BUN/CREAT SERPL: 21 (ref 9–20)
CALCIUM SERPL-MCNC: 9.7 MG/DL (ref 8.7–10.2)
CHLORIDE SERPL-SCNC: 103 MMOL/L (ref 96–106)
CHOLEST SERPL-MCNC: 214 MG/DL (ref 100–199)
CO2 SERPL-SCNC: 23 MMOL/L (ref 20–29)
CREAT SERPL-MCNC: 0.97 MG/DL (ref 0.76–1.27)
EOSINOPHIL # BLD AUTO: 0.2 X10E3/UL (ref 0–0.4)
EOSINOPHIL NFR BLD AUTO: 3 %
ERYTHROCYTE [DISTWIDTH] IN BLOOD BY AUTOMATED COUNT: 13 % (ref 11.6–15.4)
FT4I SERPL CALC-MCNC: 2.2 (ref 1.2–4.9)
GLOBULIN SER CALC-MCNC: 2.3 G/DL (ref 1.5–4.5)
GLUCOSE SERPL-MCNC: 102 MG/DL (ref 65–99)
HBA1C MFR BLD: 5.8 % (ref 4.8–5.6)
HCT VFR BLD AUTO: 45.2 % (ref 37.5–51)
HDLC SERPL-MCNC: 50 MG/DL
HGB BLD-MCNC: 15.4 G/DL (ref 13–17.7)
IMM GRANULOCYTES # BLD AUTO: 0 X10E3/UL (ref 0–0.1)
IMM GRANULOCYTES NFR BLD AUTO: 0 %
LDLC SERPL CALC-MCNC: 134 MG/DL (ref 0–99)
LDLC/HDLC SERPL: 2.7 RATIO (ref 0–3.6)
LYMPHOCYTES # BLD AUTO: 2.7 X10E3/UL (ref 0.7–3.1)
LYMPHOCYTES NFR BLD AUTO: 39 %
MCH RBC QN AUTO: 31.4 PG (ref 26.6–33)
MCHC RBC AUTO-ENTMCNC: 34.1 G/DL (ref 31.5–35.7)
MCV RBC AUTO: 92 FL (ref 79–97)
MONOCYTES # BLD AUTO: 0.7 X10E3/UL (ref 0.1–0.9)
MONOCYTES NFR BLD AUTO: 10 %
NEUTROPHILS # BLD AUTO: 3.1 X10E3/UL (ref 1.4–7)
NEUTROPHILS NFR BLD AUTO: 46 %
PLATELET # BLD AUTO: 267 X10E3/UL (ref 150–450)
POTASSIUM SERPL-SCNC: 4.4 MMOL/L (ref 3.5–5.2)
PROT SERPL-MCNC: 7 G/DL (ref 6–8.5)
RBC # BLD AUTO: 4.9 X10E6/UL (ref 4.14–5.8)
SODIUM SERPL-SCNC: 140 MMOL/L (ref 134–144)
T3RU NFR SERPL: 28 % (ref 24–39)
T4 SERPL-MCNC: 7.9 UG/DL (ref 4.5–12)
TRIGL SERPL-MCNC: 171 MG/DL (ref 0–149)
TSH SERPL DL<=0.005 MIU/L-ACNC: 1.17 UIU/ML (ref 0.45–4.5)
VLDLC SERPL CALC-MCNC: 30 MG/DL (ref 5–40)
WBC # BLD AUTO: 6.9 X10E3/UL (ref 3.4–10.8)

## 2022-02-27 DIAGNOSIS — G44.86 CERVICOGENIC HEADACHE: ICD-10-CM

## 2022-03-01 RX ORDER — BACLOFEN 10 MG/1
TABLET ORAL
Qty: 90 TABLET | Refills: 3 | Status: SHIPPED | OUTPATIENT
Start: 2022-03-01

## 2022-03-02 ENCOUNTER — OFFICE VISIT (OUTPATIENT)
Dept: INTERNAL MEDICINE | Facility: CLINIC | Age: 52
End: 2022-03-02

## 2022-03-02 VITALS
HEIGHT: 69 IN | SYSTOLIC BLOOD PRESSURE: 122 MMHG | TEMPERATURE: 97.5 F | OXYGEN SATURATION: 98 % | HEART RATE: 91 BPM | DIASTOLIC BLOOD PRESSURE: 86 MMHG | WEIGHT: 190.8 LBS | BODY MASS INDEX: 28.26 KG/M2

## 2022-03-02 DIAGNOSIS — E78.2 MIXED HYPERLIPIDEMIA: ICD-10-CM

## 2022-03-02 DIAGNOSIS — G43.009 MIGRAINE WITHOUT AURA AND WITHOUT STATUS MIGRAINOSUS, NOT INTRACTABLE: Primary | ICD-10-CM

## 2022-03-02 DIAGNOSIS — M54.2 NECK PAIN: ICD-10-CM

## 2022-03-02 PROCEDURE — 99396 PREV VISIT EST AGE 40-64: CPT | Performed by: INTERNAL MEDICINE

## 2022-03-02 NOTE — PROGRESS NOTES
Subjective   Govind Manriquez is a 51 y.o. male here today to f/u on hyperlipidemia, HA and elevated glucose.      History of Present Illness   He has been having more HA and he is seeing neuro.  He only takes the baclofen as needed.  He has had occipital nerve blocks  HA coming from the neck  His overall HA are worse as he gets older  He has HA every week atleast 2     The following portions of the patient's history were reviewed and updated as appropriate: allergies, current medications, past medical history, past social history and problem list.  He does go to the dentist reg    Review of Systems    Objective   Physical Exam  Vitals reviewed.   Constitutional:       Appearance: He is well-developed.   HENT:      Head: Normocephalic and atraumatic.      Right Ear: External ear normal.      Left Ear: External ear normal.   Eyes:      Conjunctiva/sclera: Conjunctivae normal.      Pupils: Pupils are equal, round, and reactive to light.   Neck:      Thyroid: No thyromegaly.      Trachea: No tracheal deviation.   Cardiovascular:      Rate and Rhythm: Normal rate and regular rhythm.      Heart sounds: Normal heart sounds.   Pulmonary:      Effort: Pulmonary effort is normal.      Breath sounds: Normal breath sounds.   Abdominal:      General: Bowel sounds are normal. There is no distension.      Palpations: Abdomen is soft.      Tenderness: There is no abdominal tenderness.   Musculoskeletal:         General: No deformity. Normal range of motion.      Cervical back: Normal range of motion.   Skin:     General: Skin is warm and dry.   Neurological:      Mental Status: He is alert and oriented to person, place, and time.   Psychiatric:         Behavior: Behavior normal.         Thought Content: Thought content normal.         Judgment: Judgment normal.         Vitals:    03/02/22 0723   BP: 122/86   Pulse: 91   Temp: 97.5 °F (36.4 °C)   SpO2: 98%     Body mass index is 28.18 kg/m².       Orders Only on 02/22/2022    Component Date Value Ref Range Status   • WBC 02/23/2022 6.9  3.4 - 10.8 x10E3/uL Final   • RBC 02/23/2022 4.90  4.14 - 5.80 x10E6/uL Final   • Hemoglobin 02/23/2022 15.4  13.0 - 17.7 g/dL Final   • Hematocrit 02/23/2022 45.2  37.5 - 51.0 % Final   • MCV 02/23/2022 92  79 - 97 fL Final   • MCH 02/23/2022 31.4  26.6 - 33.0 pg Final   • MCHC 02/23/2022 34.1  31.5 - 35.7 g/dL Final   • RDW 02/23/2022 13.0  11.6 - 15.4 % Final   • Platelets 02/23/2022 267  150 - 450 x10E3/uL Final   • Neutrophil Rel % 02/23/2022 46  Not Estab. % Final   • Lymphocyte Rel % 02/23/2022 39  Not Estab. % Final   • Monocyte Rel % 02/23/2022 10  Not Estab. % Final   • Eosinophil Rel % 02/23/2022 3  Not Estab. % Final   • Basophil Rel % 02/23/2022 2  Not Estab. % Final   • Neutrophils Absolute 02/23/2022 3.1  1.4 - 7.0 x10E3/uL Final   • Lymphocytes Absolute 02/23/2022 2.7  0.7 - 3.1 x10E3/uL Final   • Monocytes Absolute 02/23/2022 0.7  0.1 - 0.9 x10E3/uL Final   • Eosinophils Absolute 02/23/2022 0.2  0.0 - 0.4 x10E3/uL Final   • Basophils Absolute 02/23/2022 0.1  0.0 - 0.2 x10E3/uL Final   • Immature Granulocyte Rel % 02/23/2022 0  Not Estab. % Final   • Immature Grans Absolute 02/23/2022 0.0  0.0 - 0.1 x10E3/uL Final   • Glucose 02/23/2022 102* 65 - 99 mg/dL Final   • BUN 02/23/2022 20  6 - 24 mg/dL Final   • Creatinine 02/23/2022 0.97  0.76 - 1.27 mg/dL Final    Comment:                **Effective February 28, 2022 [facility] will begin**                   reporting the 2021 CKD-EPI creatinine equation that                   estimates kidney function without a race variable.     • eGFR Non African Am 02/23/2022 90  >59 mL/min/1.73 Final   • eGFR African Am 02/23/2022 104  >59 mL/min/1.73 Final    Comment: **In accordance with recommendations from the NKF-ASN Task force,**    [facility] is in the [facility] of updating its eGFR calculation to the    2021 CKD-EPI creatinine equation that estimates kidney function    without a race variable.     •  BUN/Creatinine Ratio 02/23/2022 21* 9 - 20 Final   • Sodium 02/23/2022 140  134 - 144 mmol/L Final   • Potassium 02/23/2022 4.4  3.5 - 5.2 mmol/L Final   • Chloride 02/23/2022 103  96 - 106 mmol/L Final   • Total CO2 02/23/2022 23  20 - 29 mmol/L Final   • Calcium 02/23/2022 9.7  8.7 - 10.2 mg/dL Final   • Total Protein 02/23/2022 7.0  6.0 - 8.5 g/dL Final   • Albumin 02/23/2022 4.7  3.8 - 4.9 g/dL Final   • Globulin 02/23/2022 2.3  1.5 - 4.5 g/dL Final   • A/G Ratio 02/23/2022 2.0  1.2 - 2.2 Final   • Total Bilirubin 02/23/2022 0.3  0.0 - 1.2 mg/dL Final   • Alkaline Phosphatase 02/23/2022 63  44 - 121 IU/L Final   • AST (SGOT) 02/23/2022 22  0 - 40 IU/L Final   • ALT (SGPT) 02/23/2022 26  0 - 44 IU/L Final   • Hemoglobin A1C 02/23/2022 5.8* 4.8 - 5.6 % Final    Comment:          Prediabetes: 5.7 - 6.4           Diabetes: >6.4           Glycemic control for adults with diabetes: <7.0     • Total Cholesterol 02/23/2022 214* 100 - 199 mg/dL Final   • Triglycerides 02/23/2022 171* 0 - 149 mg/dL Final   • HDL Cholesterol 02/23/2022 50  >39 mg/dL Final   • VLDL Cholesterol Kingsley 02/23/2022 30  5 - 40 mg/dL Final   • LDL Chol Calc (NIH) 02/23/2022 134* 0 - 99 mg/dL Final   • LDL/HDL RATIO 02/23/2022 2.7  0.0 - 3.6 ratio Final    Comment:                                     LDL/HDL Ratio                                              Men  Women                                1/2 Avg.Risk  1.0    1.5                                    Avg.Risk  3.6    3.2                                 2X Avg.Risk  6.2    5.0                                 3X Avg.Risk  8.0    6.1     • TSH 02/23/2022 1.170  0.450 - 4.500 uIU/mL Final   • T4, Total 02/23/2022 7.9  4.5 - 12.0 ug/dL Final   • T3 Uptake 02/23/2022 28  24 - 39 % Final   • Free Thyroxine Index 02/23/2022 2.2  1.2 - 4.9 Final       Current Outpatient Medications:   •  Alirocumab (Praluent) 75 MG/ML solution auto-injector, Inject 75 mg under the skin into the appropriate area as  directed Every 14 (Fourteen) Days., Disp: 6 pen, Rfl: 3  •  baclofen (LIORESAL) 10 MG tablet, TAKE 1 TABLET BY MOUTH THREE TIMES A DAY AS NEEDED FOR MUSCLE SPASM, Disp: 90 tablet, Rfl: 3  •  SUMAtriptan (Imitrex) 100 MG tablet, Take one tablet at onset of headache. May repeat dose one time in 2 hours if headache not relieved., Disp: 27 tablet, Rfl: 3  •  topiramate (TOPAMAX) 25 MG capsule (sprinkle), Take 1 capsule by mouth Daily., Disp: 90 capsule, Rfl: 3      Assessment/Plan   Diagnoses and all orders for this visit:    1. Migraine without aura and without status migrainosus, not intractable (Primary)    2. Neck pain    3. Mixed hyperlipidemia    1.  HPL-  repatha worked well  praluent is not working  He is getting insurance new soon and will see if this is covered  We check coverage with current insurance as well  2.  Neck pain and HA-seeing neuro  Soon to see pain for possible ablation

## 2022-05-18 RX ORDER — SUMATRIPTAN 100 MG/1
TABLET, FILM COATED ORAL
Qty: 27 TABLET | Refills: 3 | Status: SHIPPED | OUTPATIENT
Start: 2022-05-18 | End: 2023-01-11 | Stop reason: SDUPTHER

## 2022-06-14 ENCOUNTER — TELEPHONE (OUTPATIENT)
Dept: INTERNAL MEDICINE | Facility: CLINIC | Age: 52
End: 2022-06-14

## 2022-06-14 NOTE — TELEPHONE ENCOUNTER
RX FOR REPATHA SENT TO THE PHARMACY    ----- Message from Jodie Van MD sent at 6/14/2022  9:37 AM EDT -----  Did repatha work before?  Its fine with me to try again   ----- Message -----  From: Shannon Ellis MA  Sent: 6/14/2022   8:38 AM EDT  To: Jodie Van MD    I tried to get the praulent covered, but when I put in the results of the labs. They agree that it is not working. Should this be changed to repatha?

## 2022-08-31 DIAGNOSIS — Z00.00 HEALTH CARE MAINTENANCE: Primary | ICD-10-CM

## 2022-08-31 DIAGNOSIS — R73.09 ELEVATED GLUCOSE: ICD-10-CM

## 2022-09-02 LAB
ALBUMIN SERPL-MCNC: 4.9 G/DL (ref 3.8–4.9)
ALBUMIN/GLOB SERPL: 2.1 {RATIO} (ref 1.2–2.2)
ALP SERPL-CCNC: 62 IU/L (ref 44–121)
ALT SERPL-CCNC: 23 IU/L (ref 0–44)
AST SERPL-CCNC: 23 IU/L (ref 0–40)
BASOPHILS # BLD AUTO: 0.1 X10E3/UL (ref 0–0.2)
BASOPHILS NFR BLD AUTO: 2 %
BILIRUB SERPL-MCNC: 0.5 MG/DL (ref 0–1.2)
BUN SERPL-MCNC: 12 MG/DL (ref 6–24)
BUN/CREAT SERPL: 12 (ref 9–20)
CALCIUM SERPL-MCNC: 10.4 MG/DL (ref 8.7–10.2)
CHLORIDE SERPL-SCNC: 101 MMOL/L (ref 96–106)
CHOLEST SERPL-MCNC: 363 MG/DL (ref 100–199)
CO2 SERPL-SCNC: 24 MMOL/L (ref 20–29)
CREAT SERPL-MCNC: 0.99 MG/DL (ref 0.76–1.27)
EGFRCR-CYS SERPLBLD CKD-EPI 2021: 92 ML/MIN/1.73
EOSINOPHIL # BLD AUTO: 0.2 X10E3/UL (ref 0–0.4)
EOSINOPHIL NFR BLD AUTO: 4 %
ERYTHROCYTE [DISTWIDTH] IN BLOOD BY AUTOMATED COUNT: 13 % (ref 11.6–15.4)
GLOBULIN SER CALC-MCNC: 2.3 G/DL (ref 1.5–4.5)
GLUCOSE SERPL-MCNC: 91 MG/DL (ref 65–99)
HBA1C MFR BLD: 6.1 % (ref 4.8–5.6)
HCT VFR BLD AUTO: 46.6 % (ref 37.5–51)
HDLC SERPL-MCNC: 48 MG/DL
HGB BLD-MCNC: 15.2 G/DL (ref 13–17.7)
IMM GRANULOCYTES # BLD AUTO: 0 X10E3/UL (ref 0–0.1)
IMM GRANULOCYTES NFR BLD AUTO: 0 %
LDLC SERPL CALC-MCNC: 284 MG/DL (ref 0–99)
LDLC/HDLC SERPL: 5.9 RATIO (ref 0–3.6)
LYMPHOCYTES # BLD AUTO: 2.4 X10E3/UL (ref 0.7–3.1)
LYMPHOCYTES NFR BLD AUTO: 40 %
MCH RBC QN AUTO: 30.5 PG (ref 26.6–33)
MCHC RBC AUTO-ENTMCNC: 32.6 G/DL (ref 31.5–35.7)
MCV RBC AUTO: 94 FL (ref 79–97)
MONOCYTES # BLD AUTO: 0.6 X10E3/UL (ref 0.1–0.9)
MONOCYTES NFR BLD AUTO: 9 %
NEUTROPHILS # BLD AUTO: 2.7 X10E3/UL (ref 1.4–7)
NEUTROPHILS NFR BLD AUTO: 45 %
PLATELET # BLD AUTO: 270 X10E3/UL (ref 150–450)
POTASSIUM SERPL-SCNC: 4.6 MMOL/L (ref 3.5–5.2)
PROT SERPL-MCNC: 7.2 G/DL (ref 6–8.5)
PSA SERPL-MCNC: 1.1 NG/ML (ref 0–4)
RBC # BLD AUTO: 4.98 X10E6/UL (ref 4.14–5.8)
SODIUM SERPL-SCNC: 141 MMOL/L (ref 134–144)
TRIGL SERPL-MCNC: 157 MG/DL (ref 0–149)
TSH SERPL DL<=0.005 MIU/L-ACNC: 1.1 UIU/ML (ref 0.45–4.5)
VLDLC SERPL CALC-MCNC: 31 MG/DL (ref 5–40)
WBC # BLD AUTO: 6 X10E3/UL (ref 3.4–10.8)

## 2022-09-08 ENCOUNTER — OFFICE VISIT (OUTPATIENT)
Dept: INTERNAL MEDICINE | Facility: CLINIC | Age: 52
End: 2022-09-08

## 2022-09-08 VITALS
BODY MASS INDEX: 27.88 KG/M2 | HEART RATE: 76 BPM | SYSTOLIC BLOOD PRESSURE: 112 MMHG | OXYGEN SATURATION: 98 % | HEIGHT: 69 IN | WEIGHT: 188.2 LBS | TEMPERATURE: 98.9 F | DIASTOLIC BLOOD PRESSURE: 84 MMHG

## 2022-09-08 DIAGNOSIS — Z00.00 HEALTH CARE MAINTENANCE: Primary | ICD-10-CM

## 2022-09-08 DIAGNOSIS — R51.9 INTRACTABLE HEADACHE, UNSPECIFIED CHRONICITY PATTERN, UNSPECIFIED HEADACHE TYPE: ICD-10-CM

## 2022-09-08 DIAGNOSIS — E78.2 MIXED HYPERLIPIDEMIA: ICD-10-CM

## 2022-09-08 PROCEDURE — 99396 PREV VISIT EST AGE 40-64: CPT | Performed by: INTERNAL MEDICINE

## 2022-09-08 RX ORDER — TOPIRAMATE 25 MG/1
25 CAPSULE, COATED PELLETS ORAL DAILY
Qty: 90 CAPSULE | Refills: 3 | Status: SHIPPED | OUTPATIENT
Start: 2022-09-08 | End: 2023-01-11 | Stop reason: SDUPTHER

## 2022-09-08 NOTE — PROGRESS NOTES
"Subjective   Govind Manriquez is a 51 y.o. male and is here for a comprehensive physical exam. The patient reports problems - headache.    He has cont to have reg HA   He has had injections and     Do you take any herbs or supplements that were not prescribed by a doctor? none      Social History: no tob no etoh  Social History     Socioeconomic History   • Marital status:      Spouse name: Natasha Manriquez   • Number of children: 1   Tobacco Use   • Smoking status: Never Smoker   • Smokeless tobacco: Never Used   Substance and Sexual Activity   • Alcohol use: No   • Drug use: No   • Sexual activity: Defer       Family History:   Family History   Problem Relation Age of Onset   • Heart disease Father 56        started with CAD in 30s non-smoker but obese   • Heart failure Father    • Stroke Paternal Grandmother        Past Medical History:   Past Medical History:   Diagnosis Date   • GERD (gastroesophageal reflux disease)    • Hyperlipidemia    • Migraine    • Neck pain    • Observed sleep apnea 10/15/2020           Review of Systems    A comprehensive review of systems was negative.    Objective   /84 (BP Location: Left arm, Patient Position: Sitting)   Pulse 76   Temp 98.9 °F (37.2 °C) (Infrared)   Ht 175.3 cm (69\")   Wt 85.4 kg (188 lb 3.2 oz)   SpO2 98%   BMI 27.79 kg/m²     General Appearance:    Alert, cooperative, no distress, appears stated age   Head:    Normocephalic, without obvious abnormality, atraumatic   Eyes:    PERRL, conjunctiva/corneas clear, EOM's intact, fundi     benign, both eyes        Ears:    Normal TM's and external ear canals, both ears   Nose:   Nares normal, septum midline, mucosa normal, no drainage    or sinus tenderness   Throat:   Lips, mucosa, and tongue normal; teeth and gums normal   Neck:   Supple, symmetrical, trachea midline, no adenopathy;        thyroid:  No enlargement/tenderness/nodules; no carotid    bruit or JVD   Back:     Symmetric, no curvature, ROM " normal, no CVA tenderness   Lungs:     Clear to auscultation bilaterally, respirations unlabored   Chest wall:    No tenderness or deformity   Heart:    Regular rate and rhythm, S1 and S2 normal, no murmur, rub   or gallop   Abdomen:     Soft, non-tender, bowel sounds active all four quadrants,     no masses, no organomegaly           Extremities:   Extremities normal, atraumatic, no cyanosis or edema   Pulses:   2+ and symmetric all extremities   Skin:   Skin color, texture, turgor normal, no rashes or lesions   Lymph nodes:   Cervical, supraclavicular, and axillary nodes normal   Neurologic:   CNII-XII intact. Normal strength, sensation and reflexes       throughout       Vitals:    09/08/22 0805   BP: 112/84   Pulse: 76   Temp: 98.9 °F (37.2 °C)   SpO2: 98%     Body mass index is 27.79 kg/m².      Medications:   Current Outpatient Medications:   •  baclofen (LIORESAL) 10 MG tablet, TAKE 1 TABLET BY MOUTH THREE TIMES A DAY AS NEEDED FOR MUSCLE SPASM, Disp: 90 tablet, Rfl: 3  •  SUMAtriptan (Imitrex) 100 MG tablet, Take one tablet at onset of headache. May repeat dose one time in 2 hours if headache not relieved., Disp: 27 tablet, Rfl: 3  •  topiramate (TOPAMAX) 25 MG capsule (sprinkle), Take 1 capsule by mouth Daily., Disp: 90 capsule, Rfl: 3  •  Evolocumab (REPATHA) solution auto-injector SureClick injection, Inject 1 mL under the skin into the appropriate area as directed Every 14 (Fourteen) Days., Disp: 6 mL, Rfl: 1       Assessment & Plan   Healthy male exam.      1. Healthcare Maintenance:  2. Patient Counseling:  --Nutrition: Stressed importance of moderation in sodium/caffeine intake, saturated fat and cholesterol, caloric balance, sufficient intake of fresh fruits, vegetables, fiber, calcium and vit D  --Exercise: he does exercise  --Substance Abuse: no tob no etoh  --Dental health: he does go to the dentist reg  --Immunizations reviewed.  --Discussed benefits of screening colonoscopy.  3.  HA-  From the  neck  Not surgical -  He has tried everything and next try is cervical ablation  Will refer for enmanuel  4.  HPL-  Resume repatha which worked in the past

## 2022-09-10 LAB
CHOLEST SERPL-MCNC: 316 MG/DL (ref 100–199)
HDL SERPL-SCNC: 32.5 UMOL/L
HDLC SERPL-MCNC: 48 MG/DL
LDL SERPL QN: 20.5 NM
LDL SERPL-SCNC: 2792 NMOL/L
LDL SMALL SERPL-SCNC: 1442 NMOL/L
LDLC SERPL CALC-MCNC: 204 MG/DL (ref 0–99)
TRIGL SERPL-MCNC: 319 MG/DL (ref 0–149)

## 2022-09-20 ENCOUNTER — TRANSCRIBE ORDERS (OUTPATIENT)
Dept: ADMINISTRATIVE | Facility: HOSPITAL | Age: 52
End: 2022-09-20

## 2022-09-20 DIAGNOSIS — Z13.9 DUE FOR SCREENING: Primary | ICD-10-CM

## 2022-11-02 ENCOUNTER — PREP FOR SURGERY (OUTPATIENT)
Dept: SURGERY | Facility: SURGERY CENTER | Age: 52
End: 2022-11-02

## 2022-11-02 ENCOUNTER — OFFICE VISIT (OUTPATIENT)
Dept: PAIN MEDICINE | Facility: CLINIC | Age: 52
End: 2022-11-02

## 2022-11-02 VITALS
TEMPERATURE: 98.2 F | RESPIRATION RATE: 20 BRPM | WEIGHT: 185 LBS | HEIGHT: 69 IN | HEART RATE: 95 BPM | SYSTOLIC BLOOD PRESSURE: 137 MMHG | OXYGEN SATURATION: 97 % | BODY MASS INDEX: 27.4 KG/M2 | DIASTOLIC BLOOD PRESSURE: 95 MMHG

## 2022-11-02 DIAGNOSIS — M47.812 CERVICAL FACET SYNDROME: ICD-10-CM

## 2022-11-02 DIAGNOSIS — M47.812 CERVICAL FACET SYNDROME: Primary | ICD-10-CM

## 2022-11-02 DIAGNOSIS — G89.29 OTHER CHRONIC PAIN: Primary | ICD-10-CM

## 2022-11-02 PROCEDURE — 99204 OFFICE O/P NEW MOD 45 MIN: CPT | Performed by: ANESTHESIOLOGY

## 2022-11-02 RX ORDER — SODIUM CHLORIDE 0.9 % (FLUSH) 0.9 %
10 SYRINGE (ML) INJECTION AS NEEDED
Status: CANCELLED | OUTPATIENT
Start: 2022-11-02

## 2022-11-02 RX ORDER — SODIUM CHLORIDE, SODIUM LACTATE, POTASSIUM CHLORIDE, CALCIUM CHLORIDE 600; 310; 30; 20 MG/100ML; MG/100ML; MG/100ML; MG/100ML
9 INJECTION, SOLUTION INTRAVENOUS CONTINUOUS PRN
Status: CANCELLED | OUTPATIENT
Start: 2022-11-02

## 2022-11-02 RX ORDER — SODIUM CHLORIDE 0.9 % (FLUSH) 0.9 %
10 SYRINGE (ML) INJECTION EVERY 12 HOURS SCHEDULED
Status: CANCELLED | OUTPATIENT
Start: 2022-11-02

## 2022-11-02 NOTE — PROGRESS NOTES
CHIEF COMPLAINT  New pt referred to our office by Jodie Van MD for neck pain and headache. Pt sts pain started 30 years ago. Pt sts neck pain goes up to head causing headaches, and down to thoracic lumbar. Pt sts has never had neck surgery. Pt sts went to [facility] mgmt in the past, was on pain meds, no relief. Pt sts had cervical epidurals, no relief. Pt had an ONB in the past, no relief. Pt sts never had TPI. Pt sts had MRI cervical and thoracic 5/2021. Pt sts saw Dr. Banegas had mri lumbar and xrays, not surgical candidate. Pt sts went to chiropractor in the past, never went to PT. Pt sts currently taking advil, tylenol, baclofen prn for pain and imitrex and topamax for HA. 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     Subjective   Govind Manriquez is a 51 y.o. male.   He presents to the office for evaluation of neck pain as well as headaches. He was referred here by Dr. Van.     Most days he has moderate pain.  Rarely does he have a day without pain.  Many days flare to severe levels and the pain flares up to a severe level and then triggers migraine.    He presents with an interest in discussing interventional options and advanced options for treating his neck pain and his headaches, as he has tried some different medications and other basic injections and not had success.  He has taken prescription pain medication without any significant benefit and discontinued that.  He has had lack of improvement with cervical epidurals as well as occipital nerve blocks.  He utilizes NSAIDs and Tylenol and then uses baclofen when he has severe pain flareups and uses Imitrex to break painful flareups that progress and trigger migraines.    He has radiating pain up to the occiput of the head but he is not experiencing any classical cervical radicular symptoms.  He does complain of crepitus in the neck.    Neck Pain   This is a chronic problem. The current episode started more than 1 year ago. The  problem occurs daily. The problem has been gradually worsening. The pain is present in the left side, midline and right side. The quality of the pain is described as aching, cramping and stabbing. The pain is severe. The symptoms are aggravated by bending, position and twisting. Associated symptoms include headaches (occ). Pertinent negatives include no chest pain, fever, numbness or weakness. He has tried acetaminophen, bed rest, home exercises, ice, muscle relaxants, neck support, NSAIDs, oral narcotics, heat and chiropractic manipulation for the symptoms. The treatment provided mild relief.        PEG Assessment   What number best describes your pain on average in the past week?4  What number best describes how, during the past week, pain has interfered with your enjoyment of life?4  What number best describes how, during the past week, pain has interfered with your general activity?  4    --  The aforementioned information the Chief Complaint section and above subjective data including any HPI data, and also the Review of Systems data, has been personally reviewed and affirmed.  --        Current Outpatient Medications:   •  baclofen (LIORESAL) 10 MG tablet, TAKE 1 TABLET BY MOUTH THREE TIMES A DAY AS NEEDED FOR MUSCLE SPASM, Disp: 90 tablet, Rfl: 3  •  Evolocumab (REPATHA) solution prefilled syringe injection, Inject 1 mL under the skin into the appropriate area as directed Every 14 (Fourteen) Days., Disp: 6 mL, Rfl: 3  •  SUMAtriptan (Imitrex) 100 MG tablet, Take one tablet at onset of headache. May repeat dose one time in 2 hours if headache not relieved., Disp: 27 tablet, Rfl: 3  •  topiramate (TOPAMAX) 25 MG capsule (sprinkle), Take 1 capsule by mouth Daily., Disp: 90 capsule, Rfl: 3    The following portions of the patient's history were reviewed and updated as appropriate: allergies, current medications, past family history, past medical history, past social history, past surgical history and problem  list.    -------    The following portions of the patient's history were reviewed and updated as appropriate: allergies, current medications, past family history, past medical history, past social history, past surgical history and problem list.    Allergies   Allergen Reactions   • Zocor [Simvastatin] Anaphylaxis     fatigue       Current Outpatient Medications on File Prior to Visit   Medication Sig Dispense Refill   • baclofen (LIORESAL) 10 MG tablet TAKE 1 TABLET BY MOUTH THREE TIMES A DAY AS NEEDED FOR MUSCLE SPASM 90 tablet 3   • Evolocumab (REPATHA) solution prefilled syringe injection Inject 1 mL under the skin into the appropriate area as directed Every 14 (Fourteen) Days. 6 mL 3   • SUMAtriptan (Imitrex) 100 MG tablet Take one tablet at onset of headache. May repeat dose one time in 2 hours if headache not relieved. 27 tablet 3   • topiramate (TOPAMAX) 25 MG capsule (sprinkle) Take 1 capsule by mouth Daily. 90 capsule 3     No current facility-administered medications on file prior to visit.       Patient Active Problem List   Diagnosis   • Hyperlipidemia   • Cervical radiculopathy   • Neck pain   • Migraine without aura, not intractable   • Tension headache   • Chronic bilateral thoracic back pain   • Chronic bilateral low back pain without sciatica   • Snoring   • Hypersomnia   • Cervical facet syndrome       Past Medical History:   Diagnosis Date   • GERD (gastroesophageal reflux disease)    • Hyperlipidemia    • Migraine    • Neck pain    • Observed sleep apnea 10/15/2020       Past Surgical History:   Procedure Laterality Date   • KNEE CARTILAGE SURGERY Left        Family History   Problem Relation Age of Onset   • Heart disease Father 56        started with CAD in 30s non-smoker but obese   • Heart failure Father    • Stroke Paternal Grandmother        Social History     Socioeconomic History   • Marital status:      Spouse name: Natasha Manriquez   • Number of children: 1   Tobacco Use   •  "Smoking status: Never   • Smokeless tobacco: Never   Substance and Sexual Activity   • Alcohol use: No   • Drug use: No   • Sexual activity: Defer       -------      REVIEW OF PERTINENT MEDICAL DATA    Rahul report is reviewed:  I reviewed the document in the electronic form under the PDMP tab in the Epic EMR...  - In this function, the report is a current report in as close to real-time as possible.  - The report was available for immediate review.    - I did jose de jesus the report as reviewed.  - There is not concern for aberrant behavior based on this ekasper review.      Review of Systems   Constitutional: Negative for activity change, fatigue and fever.   HENT: Negative for congestion.    Eyes: Negative for visual disturbance.   Respiratory: Negative for cough and shortness of breath.    Cardiovascular: Negative for chest pain.   Gastrointestinal: Positive for nausea (occ) and vomiting (occ ). Negative for constipation and diarrhea.   Genitourinary: Negative for difficulty urinating.   Musculoskeletal: Positive for neck pain and neck stiffness.   Neurological: Positive for headaches (occ). Negative for dizziness, weakness, light-headedness and numbness.   Psychiatric/Behavioral: Positive for sleep disturbance (occ). Negative for agitation and suicidal ideas. The patient is not nervous/anxious.          Vitals:    11/02/22 1423   BP: 137/95   Pulse: 95   Resp: 20   Temp: 98.2 °F (36.8 °C)   SpO2: 97%   Weight: 83.9 kg (185 lb)   Height: 175.3 cm (69\")   PainSc:   1   PainLoc: Neck         Objective   Physical Exam  Vitals and nursing note reviewed.   Constitutional:       General: He is not in acute distress.     Appearance: Normal appearance. He is well-developed. He is not toxic-appearing.   HENT:      Head: Normocephalic and atraumatic.      Right Ear: Hearing and external ear normal.      Left Ear: Hearing and external ear normal.      Nose: Nose normal.   Eyes:      General: Lids are normal.      " Conjunctiva/sclera: Conjunctivae normal.      Pupils: Pupils are equal, round, and reactive to light.   Neck:      Meningeal: Brudzinski's sign and Kernig's sign absent.   Pulmonary:      Effort: Pulmonary effort is normal. No respiratory distress.   Musculoskeletal:      Cervical back: Normal range of motion. Crepitus present. No spinous process tenderness. Normal range of motion.      Comments: Spurling is negative.  Cervical facet tenderness in the mid cervical area is present as well as lower cervical.  Painful area appears to be from about C4-C6.   Neurological:      Mental Status: He is alert and oriented to person, place, and time.      Cranial Nerves: Cranial nerves 2-12 are intact. No cranial nerve deficit.      Sensory: Sensation is intact.      Motor: Motor function is intact.      Coordination: Coordination is intact.      Gait: Gait is intact.      Deep Tendon Reflexes:      Reflex Scores:       Tricep reflexes are 1+ on the right side and 1+ on the left side.       Bicep reflexes are 1+ on the right side and 1+ on the left side.       Brachioradialis reflexes are 1+ on the right side and 1+ on the left side.  Psychiatric:         Behavior: Behavior normal.         Assessment & Plan   Diagnoses and all orders for this visit:    1. Other chronic pain (Primary)    2. Cervical facet syndrome        --- Follow-up for procedure.... bilateral CMBB, likely C4-6. X2, with OV in between    --Info for cervical medial branch block and diagnostically negative then  Of care would be to move up and address C2-3 and C3-4       -------  Education about Medial Branch Blockade and RF Therapy:    This medial branch blockade (MBB) suggested is intended for diagnostic purposes, with the intent of offering the patient Radiofrequency thermal rhizotomy (RF) if the MBB is diagnostically effective.  The diagnostic blockade is necessary to determine the likelihood that RF therapy could be efficacious in providing long term relief  "to the patient.    Medial branches are sensory nerve branches that connect to a facet joint and transmit sensations & pain signals from that joint.  Facet is a term for the type of joints found in the spine.  Medial branches are the nerves that go to a facet, and therefore are also sometimes called \"facet joint nerves\" (FJNs).      In a medial branch blockade procedure, xray fluoroscopy is used to verify the locations of the outside of the joint lines which are being targeted.  Under xray guidance, needles are placed to these areas.  Contrast dye is injected to confirm proper placement, with dye flowing over the joint area, and to ensure that the dye does not flow into unintended areas such as a vein.  When this is confirmed, local anesthetic is injected to block the medial branch at that joint level.      If MBBs are diagnostically successful in blocking pain, then the patient is most likely a great candidate for Radiofrequency of those facet joint nerves.  In the RF procedure, needles are placed to the joint lines in the same fashion, and after testing, the needle tips are heated to thermally treat the nerves, blocking the nerves by in essence damaging the nerves with the heat treatment(non-pulsed).       Medically, a successful RF procedure should provide a patient with 50% pain relief or more for at least 6 months.  Clinical experience suggests that successful patients receive relief more in the range of 12 months on average.  We also discussed that a fortunate minority of patients receive therapeutic success from the MBB, and may not require RF ablation.  If a patient receives more than 8 weeks of relief from MBB, then occasional repeat MBB for therapeutic purposes is a very reasonable alternative therapy.  This course of therapy is consistent with our LCDs according to our CMS  in the area, and therefore other insurance providers should follow accordingly.  We will monitor our patients to screen " for these therapeutic responders and will offer RF therapy only when necessary.        We discussed that MBB & RF are not without risks.  Guidelines regarding anticoagulant use & neuraxial procedures will be respected.  Patients that are ill or otherwise may be at risk for sepsis will not have their spines accessed by neuraxial injections of any type.  This patient will not be offered these therapies if there is an increased risk.   We discussed that there is a risk of postprocedural pain and also a risk of worsening of clinical picture with these procedures as with any neuraxial procedure.    -------               Dictated utilizing Dragon dictation. EMR Dragon/Transcription disclaimer:   Much of this encounter note is an electronic transcription/translation of spoken language to printed text. The electronic translation of spoken language may permit erroneous, or at times, nonsensical words or phrases to be inadvertently transcribed; Although I have reviewed the note for such errors, some may still exist.          ---      Vitals:    11/02/22 1423   PainSc:   1   PainLoc: Neck          Govind Manriquez reports a pain score of 1.  Given his pain assessment as noted, treatment options were discussed and the following options were decided upon as a follow-up plan to address the patient's pain: continuation of current treatment plan for pain and educational materials on pain management.

## 2022-11-03 ENCOUNTER — TRANSCRIBE ORDERS (OUTPATIENT)
Dept: SURGERY | Facility: SURGERY CENTER | Age: 52
End: 2022-11-03

## 2022-11-03 DIAGNOSIS — Z41.9 SURGERY, ELECTIVE: Primary | ICD-10-CM

## 2022-11-03 PROBLEM — M47.812 CERVICAL FACET SYNDROME: Status: ACTIVE | Noted: 2022-11-03

## 2022-11-09 ENCOUNTER — TRANSCRIBE ORDERS (OUTPATIENT)
Dept: SURGERY | Facility: SURGERY CENTER | Age: 52
End: 2022-11-09

## 2022-11-09 DIAGNOSIS — Z41.9 SURGERY, ELECTIVE: Primary | ICD-10-CM

## 2022-12-02 ENCOUNTER — HOSPITAL ENCOUNTER (OUTPATIENT)
Dept: GENERAL RADIOLOGY | Facility: SURGERY CENTER | Age: 52
Setting detail: HOSPITAL OUTPATIENT SURGERY
End: 2022-12-02

## 2022-12-02 ENCOUNTER — HOSPITAL ENCOUNTER (OUTPATIENT)
Facility: SURGERY CENTER | Age: 52
Setting detail: HOSPITAL OUTPATIENT SURGERY
Discharge: HOME | End: 2022-12-02
Attending: ANESTHESIOLOGY | Admitting: ANESTHESIOLOGY

## 2022-12-02 VITALS
BODY MASS INDEX: 27.4 KG/M2 | HEART RATE: 75 BPM | DIASTOLIC BLOOD PRESSURE: 99 MMHG | WEIGHT: 185 LBS | OXYGEN SATURATION: 99 % | RESPIRATION RATE: 18 BRPM | HEIGHT: 69 IN | TEMPERATURE: 97.3 F | SYSTOLIC BLOOD PRESSURE: 152 MMHG

## 2022-12-02 DIAGNOSIS — Z41.9 SURGERY, ELECTIVE: ICD-10-CM

## 2022-12-02 DIAGNOSIS — M47.812 CERVICAL FACET SYNDROME: ICD-10-CM

## 2022-12-02 PROCEDURE — 25010000002 IOPAMIDOL 61 % SOLUTION: Performed by: ANESTHESIOLOGY

## 2022-12-02 PROCEDURE — 64491 INJ PARAVERT F JNT C/T 2 LEV: CPT | Performed by: ANESTHESIOLOGY

## 2022-12-02 PROCEDURE — 77002 NEEDLE LOCALIZATION BY XRAY: CPT

## 2022-12-02 PROCEDURE — 64490 INJ PARAVERT F JNT C/T 1 LEV: CPT | Performed by: ANESTHESIOLOGY

## 2022-12-02 PROCEDURE — 76000 FLUOROSCOPY <1 HR PHYS/QHP: CPT

## 2022-12-02 RX ORDER — SODIUM CHLORIDE 0.9 % (FLUSH) 0.9 %
10 SYRINGE (ML) INJECTION AS NEEDED
Status: DISCONTINUED | OUTPATIENT
Start: 2022-12-02 | End: 2022-12-02 | Stop reason: HOSPADM

## 2022-12-02 RX ORDER — SODIUM CHLORIDE, SODIUM LACTATE, POTASSIUM CHLORIDE, CALCIUM CHLORIDE 600; 310; 30; 20 MG/100ML; MG/100ML; MG/100ML; MG/100ML
20 INJECTION, SOLUTION INTRAVENOUS CONTINUOUS
Status: DISCONTINUED | OUTPATIENT
Start: 2022-12-02 | End: 2022-12-02 | Stop reason: HOSPADM

## 2022-12-02 RX ORDER — SODIUM CHLORIDE 0.9 % (FLUSH) 0.9 %
10 SYRINGE (ML) INJECTION EVERY 12 HOURS SCHEDULED
Status: DISCONTINUED | OUTPATIENT
Start: 2022-12-02 | End: 2022-12-02 | Stop reason: HOSPADM

## 2022-12-02 RX ORDER — SODIUM CHLORIDE, SODIUM LACTATE, POTASSIUM CHLORIDE, CALCIUM CHLORIDE 600; 310; 30; 20 MG/100ML; MG/100ML; MG/100ML; MG/100ML
9 INJECTION, SOLUTION INTRAVENOUS CONTINUOUS PRN
Status: DISCONTINUED | OUTPATIENT
Start: 2022-12-02 | End: 2022-12-02

## 2022-12-02 RX ADMIN — SODIUM CHLORIDE, POTASSIUM CHLORIDE, SODIUM LACTATE AND CALCIUM CHLORIDE 20 ML/HR: 600; 310; 30; 20 INJECTION, SOLUTION INTRAVENOUS at 14:50

## 2022-12-02 NOTE — DISCHARGE INSTRUCTIONS
[facility] - Post-procedure Instructions          --  While there are no absolute restrictions, it is recommended that you do not perform strenuous activity today. In the morning, you may resume your level of activity as before your block.    --  If you have a band-aid at your injection site, please remove it later today. Observe the area for any redness, swelling, pus-like drainage, or a temperature over 101°. If any of these symptoms occur, please call your doctor at [phone]. If after office hours, leave a message and the on-call provider will return your call.    --  Ice may be applied to your injection site. It is recommended you avoid direct heat (heating pad; hot tub) for 1-2 days.    --  Call [facility] at [phone] if you experience persistent headache, persistent bleeding from the injection site, or severe pain not relieved by heat or oral medication.    --  Do not make important decisions today.    --  Due to the effects of the block and/or the I.V. Sedation, DO NOT drive or operate hazardous machinery for 12 hours.  Local anesthetics may cause numbness after procedure and precautions must be taken with regards to operating equipment as well as with walking, even if ambulating with assistance of another person or with an assistive device.    --  Do not drink alcohol for 12 hours.    -- You may return to work tomorrow, or as directed by your referring doctor.    --  Occasionally you may notice a slight increase in your pain after the procedure. This should start to improve within the next 24-48 hours. Radiofrequency ablation procedure pain may last 3-4 weeks.    --  It may take as long as 3-4 days before you notice a gradual improvement in your pain and/or other symptoms.    -- You may continue to take your prescribed pain medication as needed.    --  Some normal possible side effects of steroid use could include fluid retention, increased blood sugar, dull headache,  increased sweating, increased appetite, mood swings and flushing.    --  Diabetics are recommended to watch their blood glucose level closely for 24-48 hours after the injection.    --  Must stay in PACU for 20 min upon arrival and prove no leg weakness before being discharged.    --  IN THE EVENT OF A LIFE THREATENING EMERGENCY, (CHEST PAIN, BREATHING DIFFICULTIES, PARALYSIS…) YOU SHOULD GO TO YOUR NEAREST EMERGENCY ROOM.    --  You should be contacted by our office within 2-3 days to schedule follow up or next appointment date.  If not contacted within 7 days, please call the office at [phone]

## 2022-12-02 NOTE — OP NOTE
Cervical Medial Branch Blockade  [facility]      PREOPERATIVE DIAGNOSIS:  Cervical spondylosis without myelopathy   POSTOPERATIVE DIAGNOSIS:  Same as preoperative diagnosis    PROCEDURE:    Cervical Facet Nerve (medial branch) Blocks, with Fluoroscopy:      LEVELS: right  C4, C5 and C6    PRE-PROCEDURE DISCUSSION WITH PATIENT:    Risks and complications were discussed with the patient prior to starting the procedure and informed consent was obtained.  We discussed various topics, including but not limited to bleeding, infection, injury, nerve injury, paralysis, coma, death, postprocedural soreness or painful flare, worsening of clinical picture, lack of pain relief.  We discussed the diagnostic nature of medial branch blockades.      SURGEON:  Brendan Glez MD    REASON FOR PROCEDURE:    The patient complains of pain that seems to have a significant axial component Painful area identified on exam under fluoroscopy    SEDATION:  Patient declined administration of moderate sedation   and , but an IV access was obtained for safety.  ANESTHETIC:   Marcaine 0.5%  STEROID:  NONE  TOTAL VOLUME OF SOLUTION:  3 mL      DESCRIPTON OF PROCEDURE:  After obtaining informed consent, the patient was placed in the prone position. IV access was obtained.  EKG, blood pressure, and pulse oximeter were monitored  by an RN under my direct guidance.  The cervical area was prepped with Chloraprep and draped with sterile barrier.     Under fluoroscopic guidance the waists of the above mentioned vertebra were identified and marked.  Skin and subcutaneous tissue were then anesthetized with 1% lidocaine 1mL at each point. Then spinal needles were introduced under fluoroscopic guidance at the waists of these vertebrae contacting periosteum on the lateral edge of the vertebra on the AP fluroscopic view. After confirming the position of the needle under fluoroscopic PA and lateral views, confirming the needle position in  the center of the trapezoid at each level, and confirming negative aspiration of blood and CSF, 0.25 mL of Omnipaque was injected.  Proper spread and lack of vascular uptake was demonstrated.  A solution was prepared as above, and 1 mL of that solution was injected very slowly each level.      Needles were removed intact from all levels.  Vitals were stable throughout.       ESTIMATED BLOOD LOSS:  minimal  SPECIMENS:  None    COMPLICATIONS:    No complications were noted., There was no indication of vascular uptake on live injection of contrast dye., There was no indication of intrathecal uptake on live injection of contrast dye., There was not any evidence of dural puncture.   and The patient did not have any signs of postprocedure numbness nor weakness.    TOLERANCE & DISCHARGE CONDITION:    The patient tolerated the procedure well.  The patient was transported to the recovery area without difficulties.  The patient was discharged to home under the care of family in stable and satisfactory condition.        PLAN OF CARE:  1. The patient was given our standard instruction sheet.  2. We discussed that Cervical Medial Branch Blockade is a diagnostic procedure in consideration for radiofrequency ablation if two diagnostic procedures proved to be positive for significant benefit.  If sustained relief of six to eight weeks is obtained, then an alternative plan could be therapeutic cervical medial branch blocks on an as-needed basis.  3. The patient is asked to keep a pain log hourly for 8 hours postoperatively today.  4. The patient will Return to clinic 1 wk.  5. The patient will resume all medications as per the medication reconciliation sheet.

## 2022-12-08 ENCOUNTER — OFFICE VISIT (OUTPATIENT)
Dept: PAIN MEDICINE | Facility: CLINIC | Age: 52
End: 2022-12-08

## 2022-12-08 VITALS
BODY MASS INDEX: 27.55 KG/M2 | HEIGHT: 69 IN | TEMPERATURE: 98.4 F | SYSTOLIC BLOOD PRESSURE: 132 MMHG | WEIGHT: 186 LBS | DIASTOLIC BLOOD PRESSURE: 93 MMHG | OXYGEN SATURATION: 99 % | HEART RATE: 78 BPM

## 2022-12-08 DIAGNOSIS — M54.12 CERVICAL RADICULOPATHY: ICD-10-CM

## 2022-12-08 DIAGNOSIS — M47.812 CERVICAL FACET SYNDROME: Primary | ICD-10-CM

## 2022-12-08 DIAGNOSIS — G43.009 MIGRAINE WITHOUT AURA AND WITHOUT STATUS MIGRAINOSUS, NOT INTRACTABLE: ICD-10-CM

## 2022-12-08 PROCEDURE — 99213 OFFICE O/P EST LOW 20 MIN: CPT | Performed by: PHYSICIAN ASSISTANT

## 2022-12-08 NOTE — PROGRESS NOTES
CHIEF COMPLAINT  F/U neck pain-CERVICAL MEDIAL BRANCH BLOCK - likely C4 C5 C6 right- patient states that he had 80% for 3 days.      Subjective   Govind Manriquez is a 51 y.o. male  who presents to the office for follow-up of procedure.  He completed a #1 diagnostic right C4-C6 C MBB   on 12/2/2022 performed by Dr. Glez for management of cervical pain. Patient reports 80% relief from the procedure x3 days with improvement of range of motion.  Since that time the patient has noted recrudescence of painful axial cervical spine pain referred into the cervical paraspinal muscles, right greater than left.  Patient also continues to suffer from migraine headaches and is suffering from a headache on today.    Pain today 1/10 VAS in severity.      Neck Pain   This is a chronic problem. The current episode started more than 1 year ago. The problem occurs constantly. The problem has been gradually worsening. The pain is present in the right side, midline and left side. The quality of the pain is described as aching. The pain is at a severity of 1/10. The pain is mild. The symptoms are aggravated by position. The pain is same all the time. Stiffness is present all day. Associated symptoms include headaches. Pertinent negatives include no chest pain, fever, numbness or weakness.        PEG Assessment   What number best describes your pain on average in the past week?1  What number best describes how, during the past week, pain has interfered with your enjoyment of life?2  What number best describes how, during the past week, pain has interfered with your general activity?  2    Review of Pertinent Medical Data ---    I have reviewed Dr. Glez's consultation note from 11/2/2022 with recommendation to consider diagnostic injections to address C2-3 and 3-4 if the current levels fail to continue to respond.    MRI examination of the thoracic spine with and without contrast, MRI  cervical spine with and without contrast      HISTORY right-sided neck pain, headaches.     COMPARISON: MRI cervical spine 11/16/2018 and MRI thoracic spine  03/22/2019.     MRI examination of the cervical spine with and without contrast: The  alignment of the cervical spine is within normal limits. Signal  intensity within the cervical cord is normal on the sagittal T2  sequence.     C2-3: There is no evidence of disc bulge or herniation.     C3-4: Mild neural foraminal, as is present on the left and there is  moderate neural foraminal, as on the right secondary to uncovertebral  degenerative disease. This is similar to the prior examination.     C4-5: Mild to moderate facet degenerative disease is present on the  right and left respectively. There is mild and mild to moderate neural  foraminal, as on the right and left respectively secondary to  uncovertebral degenerative disease and facet hypertrophy. This appears  similar to the prior examination.     C5-6: Mild facet degenerative disease is present bilaterally..     C6-7: There is no evidence of disc bulge or herniation.     C7-T1: There is no evidence of disc bulge or herniation.     After contrast administration enhancement involving the facets to the  left are noted at C3-4.     IMPRESSION:  No evidence of disc herniation, cord signal abnormality or  of cord compression. Mild to moderate degenerative disease involving the  cervical spine is noted as described above most prominent to the left at  C3-4 where there is also mild to moderate enhancement involving the  facets. The appearance is similar to the MRI examination of 11/16/2018.  See above.        MRI examination of the thoracic spine with and without contrast:     The alignment of the thoracic spine is within normal limits. Signal  intensity within the thoracic cord is normal on the sagittal T2  sequence. Signal loss involving the T10 vertebral body is appreciated on  the sagittal T1 sequence. This is T2 hyperintense and enhances mildly  after  "contrast demonstration. It is unchanged as compared to the  examination of 03/20/2019 but new versus the MRI examination of  03/06/2007. There was no evidence of abnormal activity on the bone scan  performed on 04/16/2019. The stability and appearance is therefore  consistent with an atypical hemangioma.     There are stones of disc bulge, herniation or of central canal stenosis.     IMPRESSION: No evidence of disc herniation, canal stenosis or of cord  signal abnormality. An atypical hemangioma is appreciated involving the  T10 vertebral body unchanged versus the MRI examination thoracic spine  from 03/22/2019.     This report was finalized on 5/12/2021 11:42 AM by Dr. Ketan Peoples M.D.    The following portions of the patient's history were reviewed and updated as appropriate: allergies, current medications, past family history, past medical history, past social history, past surgical history and problem list.    Review of Systems   Constitutional: Negative for activity change, chills, fatigue and fever.   HENT: Negative for congestion.    Respiratory: Negative for chest tightness and shortness of breath.    Cardiovascular: Negative for chest pain.   Gastrointestinal: Negative for abdominal pain, constipation and diarrhea.   Genitourinary: Negative for difficulty urinating and dysuria.   Musculoskeletal: Positive for neck pain. Negative for back pain.   Neurological: Positive for headaches. Negative for dizziness, weakness, light-headedness and numbness.   Psychiatric/Behavioral: Positive for sleep disturbance. Negative for agitation. The patient is not nervous/anxious.      I have reviewed and confirmed the accuracy of the ROS as documented by the MA/ANA M/SANTINO Damian     Vitals:    12/08/22 0809   BP: 132/93   Pulse: 78   Temp: 98.4 °F (36.9 °C)   SpO2: 99%   Weight: 84.4 kg (186 lb)   Height: 175.3 cm (69\")   PainSc:   1   PainLoc: Neck         Objective   Physical Exam  Vitals and nursing note " reviewed.   Constitutional:       Appearance: Normal appearance. He is normal weight.   HENT:      Head: Normocephalic.   Pulmonary:      Effort: Pulmonary effort is normal.   Musculoskeletal:      Cervical back: Tenderness (Tenderness to palpation within the overlying bilateral cervical facet joint spaces) present. Pain with movement, spinous process tenderness and muscular tenderness present. Decreased range of motion.      Comments: Pain is increased with lateral bending and cervical extension for prolonged periods   Skin:     General: Skin is warm and dry.   Neurological:      General: No focal deficit present.      Mental Status: He is alert and oriented to person, place, and time.      Cranial Nerves: Cranial nerves 2-12 are intact.      Sensory: Sensation is intact.      Motor: Motor function is intact.      Gait: Gait is intact.   Psychiatric:         Mood and Affect: Mood normal.         Behavior: Behavior normal.         Thought Content: Thought content normal.         Judgment: Judgment normal.       Assessment & Plan   Diagnoses and all orders for this visit:    1. Cervical facet syndrome (Primary)    2. Cervical radiculopathy    3. Migraine without aura and without status migrainosus, not intractable        --- Follow-up as already scheduled for the second confirmatory injection with plan to proceed to RFA with continued favorable response              Dictated utilizing Dragon dictation.      Patient remained masked during entire encounter. No cough present. I donned a mask and eye protection throughout entire visit. Prior to donning mask and eye protection, hand hygiene was performed, as well as when it was doffed.  I was closer than 6 feet, but not for an extended period of time. No obvious exposure to any bodily fluids.

## 2022-12-19 ENCOUNTER — TELEPHONE (OUTPATIENT)
Dept: PAIN MEDICINE | Facility: CLINIC | Age: 52
End: 2022-12-19

## 2022-12-19 NOTE — TELEPHONE ENCOUNTER
Caller: FÉLIX REINA   Relationship to patient: PATIENT     Best call back number: [phone]    Type of visit: PROCEDURE F1 GUIDED NDL PLACE ASP/INJ/LOC    Requested date: AFTER FIRST OF YEAR, PENDING INSURANCE     If rescheduling, when is the original appointment:12/20/22 8:30    Additional notes: PLEASE CANCEL, CALL AFTER FIRST OF YEAR FOR RESCHEDULE

## 2023-01-11 ENCOUNTER — TELEPHONE (OUTPATIENT)
Dept: INTERNAL MEDICINE | Facility: CLINIC | Age: 53
End: 2023-01-11
Payer: COMMERCIAL

## 2023-01-11 DIAGNOSIS — M54.2 NECK PAIN: ICD-10-CM

## 2023-01-11 DIAGNOSIS — G44.209 TENSION HEADACHE: Primary | ICD-10-CM

## 2023-01-11 RX ORDER — SUMATRIPTAN 100 MG/1
TABLET, FILM COATED ORAL
Qty: 27 TABLET | Refills: 3 | Status: SHIPPED | OUTPATIENT
Start: 2023-01-11

## 2023-01-11 RX ORDER — TOPIRAMATE 25 MG/1
25 CAPSULE, COATED PELLETS ORAL DAILY
Qty: 90 CAPSULE | Refills: 3 | Status: SHIPPED | OUTPATIENT
Start: 2023-01-11

## 2023-01-11 NOTE — TELEPHONE ENCOUNTER
REFERRAL PLACED. RX'S SENT TO NEW PHARMACY    ----- Message from Jodie Van MD sent at 1/11/2023 12:32 PM EST -----  Regarding: FW: Govind Manriquez (insurance/referral/scripts)  Contact: [phone]  Okay to send the referral and refill the medication  ----- Message -----  From: Shannon Ellis MA  Sent: 1/11/2023  10:34 AM EST  To: Jodie Van MD  Subject: FW: Govind Manriquez (insurance/referral/s#      ----- Message -----  From: Govind Manriquez  Sent: 1/11/2023  10:31 AM EST  To: Julissa [address]  Subject: Govind Manriquez (insurance/referral/scrip#    Hello Dr. Van,    Currently Jain and [facility] [employer] have parted ways.  I therefore have to make changes.  I do plan to stay with you though!      I am unfortunately having to [facility]  [facility] you [facility] me, he is awesome.  I was skilled nursing finished with my cervical nerve blocks and waiting for an ablation.  May I have a referral to:  Dr. Elias Durbin, [facility], [address] [place] [address]?    Also,  can you please send all my scripts now to [facility], [address] [place] [address], [place] [address].      Sorry having to ask for everything.    -Govind Manriquez  1970  [phone]

## 2023-02-06 ENCOUNTER — PREP FOR SURGERY (OUTPATIENT)
Dept: SURGERY | Facility: SURGERY CENTER | Age: 53
End: 2023-02-06
Payer: COMMERCIAL

## 2023-02-06 ENCOUNTER — HOSPITAL ENCOUNTER (OUTPATIENT)
Dept: CARDIOLOGY | Facility: HOSPITAL | Age: 53
Discharge: HOME | End: 2023-02-06
Admitting: INTERNAL MEDICINE

## 2023-02-06 VITALS
BODY MASS INDEX: 26.2 KG/M2 | SYSTOLIC BLOOD PRESSURE: 128 MMHG | HEART RATE: 66 BPM | DIASTOLIC BLOOD PRESSURE: 85 MMHG | HEIGHT: 70 IN | WEIGHT: 183 LBS

## 2023-02-06 DIAGNOSIS — Z13.9 DUE FOR SCREENING: ICD-10-CM

## 2023-02-06 DIAGNOSIS — M47.812 CERVICAL FACET SYNDROME: Primary | ICD-10-CM

## 2023-02-06 LAB
BH CV ECHO MEAS - DIST AO DIAM: 1.44 CM
BH CV VAS BP LEFT ARM: NORMAL MMHG
BH CV VAS BP RIGHT ARM: NORMAL MMHG
BH CV XLRA MEAS - MID AO DIAM: 1.68 CM
BH CV XLRA MEAS - PAD LEFT ABI DP: 1.2
BH CV XLRA MEAS - PAD LEFT ABI PT: 1.27
BH CV XLRA MEAS - PAD LEFT ARM: 125 MMHG
BH CV XLRA MEAS - PAD LEFT LEG DP: 154 MMHG
BH CV XLRA MEAS - PAD LEFT LEG PT: 162 MMHG
BH CV XLRA MEAS - PAD RIGHT ABI DP: 1.2
BH CV XLRA MEAS - PAD RIGHT ABI PT: 1.29
BH CV XLRA MEAS - PAD RIGHT ARM: 128 MMHG
BH CV XLRA MEAS - PAD RIGHT LEG DP: 153 MMHG
BH CV XLRA MEAS - PAD RIGHT LEG PT: 165 MMHG
BH CV XLRA MEAS - PROX AO DIAM: 1.87 CM
BH CV XLRA MEAS LEFT ICA/CCA RATIO: 1.09
BH CV XLRA MEAS LEFT MID CCA PSV: NORMAL CM/SEC
BH CV XLRA MEAS LEFT MID ICA PSV: NORMAL CM/SEC
BH CV XLRA MEAS LEFT PROX ECA PSV: NORMAL CM/SEC
BH CV XLRA MEAS RIGHT ICA/CCA RATIO: 0.98
BH CV XLRA MEAS RIGHT MID CCA PSV: NORMAL CM/SEC
BH CV XLRA MEAS RIGHT MID ICA PSV: NORMAL CM/SEC
BH CV XLRA MEAS RIGHT PROX ECA PSV: NORMAL CM/SEC
MAXIMAL PREDICTED HEART RATE: 168 BPM
STRESS TARGET HR: 143 BPM

## 2023-02-06 PROCEDURE — 93799 UNLISTED CV SVC/PROCEDURE: CPT

## 2023-02-06 RX ORDER — SODIUM CHLORIDE 0.9 % (FLUSH) 0.9 %
10 SYRINGE (ML) INJECTION AS NEEDED
Status: CANCELLED | OUTPATIENT
Start: 2023-02-06

## 2023-02-06 RX ORDER — SODIUM CHLORIDE 0.9 % (FLUSH) 0.9 %
10 SYRINGE (ML) INJECTION EVERY 12 HOURS SCHEDULED
Status: CANCELLED | OUTPATIENT
Start: 2023-02-06

## 2023-02-06 NOTE — TELEPHONE ENCOUNTER
Mr Hillman wanted to let Shannon know since [facility] is back with [facility], no referrals are needed.

## 2023-02-28 ENCOUNTER — TRANSCRIBE ORDERS (OUTPATIENT)
Dept: SURGERY | Facility: SURGERY CENTER | Age: 53
End: 2023-02-28
Payer: COMMERCIAL

## 2023-02-28 DIAGNOSIS — Z41.9 SURGERY, ELECTIVE: Primary | ICD-10-CM

## 2023-03-07 ENCOUNTER — PREP FOR SURGERY (OUTPATIENT)
Dept: SURGERY | Facility: SURGERY CENTER | Age: 53
End: 2023-03-07
Payer: COMMERCIAL

## 2023-03-07 DIAGNOSIS — M47.812 CERVICAL FACET SYNDROME: Primary | ICD-10-CM

## 2023-03-07 RX ORDER — SODIUM CHLORIDE 0.9 % (FLUSH) 0.9 %
10 SYRINGE (ML) INJECTION AS NEEDED
OUTPATIENT
Start: 2023-03-07

## 2023-03-07 RX ORDER — SODIUM CHLORIDE 0.9 % (FLUSH) 0.9 %
10 SYRINGE (ML) INJECTION EVERY 12 HOURS SCHEDULED
OUTPATIENT
Start: 2023-03-07

## 2023-03-21 ENCOUNTER — PROCEDURE VISIT (OUTPATIENT)
Dept: NEUROLOGY | Facility: CLINIC | Age: 53
End: 2023-03-21
Payer: COMMERCIAL

## 2023-03-21 DIAGNOSIS — M54.81 OCCIPITAL NEURALGIA OF RIGHT SIDE: ICD-10-CM

## 2023-03-21 DIAGNOSIS — G44.86 CERVICOGENIC HEADACHE: ICD-10-CM

## 2023-03-21 DIAGNOSIS — M54.81 BILATERAL OCCIPITAL NEURALGIA: Primary | ICD-10-CM

## 2023-03-21 PROCEDURE — 64405 NJX AA&/STRD GR OCPL NRV: CPT | Performed by: NURSE PRACTITIONER

## 2023-03-21 RX ORDER — ROPIVACAINE HYDROCHLORIDE 5 MG/ML
6 INJECTION, SOLUTION EPIDURAL; INFILTRATION; PERINEURAL ONCE
Status: COMPLETED | OUTPATIENT
Start: 2023-03-21 | End: 2023-03-21

## 2023-03-21 RX ORDER — DEXAMETHASONE SODIUM PHOSPHATE 4 MG/ML
8 INJECTION, SOLUTION INTRA-ARTICULAR; INTRALESIONAL; INTRAMUSCULAR; INTRAVENOUS; SOFT TISSUE ONCE
Status: COMPLETED | OUTPATIENT
Start: 2023-03-21 | End: 2023-03-21

## 2023-03-21 RX ADMIN — DEXAMETHASONE SODIUM PHOSPHATE 8 MG: 4 INJECTION, SOLUTION INTRA-ARTICULAR; INTRALESIONAL; INTRAMUSCULAR; INTRAVENOUS; SOFT TISSUE at 10:09

## 2023-03-21 RX ADMIN — ROPIVACAINE HYDROCHLORIDE 6 ML: 5 INJECTION, SOLUTION EPIDURAL; INFILTRATION; PERINEURAL at 10:09

## 2023-03-21 NOTE — PROGRESS NOTES
Procedure   Procedures   Patient here today requesting repeat occipital nerve blocks; patient reports little to no relief with prior currently to helps headache.  Risk and benefits of procedure discussed with patient he verbalizes understanding and agrees to proceed with procedure.      Indication for Procedure: right and left Occipital Neuralgia  Procedure:  right and left Greater Occipital Nerve Block    Risks explained to the patient including the risk of allergic reaction, bleeding, bruising, local infection, and localized pain. Patient expressed understanding and both verbal and written consent was obtained.    Time out performed.    right and left occipital nerve root identified utilizing palpation techniques.      5 mL of of the following medications were administered utilizing a (2) 5mL syringes and a 25 gauge needle surrounding the nerve root in a 360 degree fashion.  The mixture contained:  4 mL of 0.5% ropivacaine (per side)   1 mL of 4 mg dexamethasone (per side)     The patient tolerated the procedure well with no complications and no blood loss.    The patient is to follow up for repeat injection in in 6 weeks      Plan:   · May repeat occipital nerve block every 6 to 8 weeks if indicated/desired  · Continue as needed baclofen/amitriptyline-recommended taking Topamax daily in lieu of as needed      Ashanti Manuel, APRN

## 2023-03-21 NOTE — SIGNIFICANT NOTE
Patient educated on the following :    - If you are receiving Sedation for your procedure Nothing to Eat 6 hours and only clear liquids for 2 hours prior to your procedure.    -You will need to have someone drive you home after your PROCEDURE and remain with you for 24 hours after the PROCEDURE  - The date of your procedure, your are welcome to have one visitor at bedside or remain within 10-15 minutes of [facility]  -You will need to arrive at 0730 on 3/23/23 PROCEDURE  -Please contact [facility] at: [phone] with any questions and/or concerns

## 2023-03-22 ENCOUNTER — TELEPHONE (OUTPATIENT)
Dept: NEUROLOGY | Facility: CLINIC | Age: 53
End: 2023-03-22
Payer: COMMERCIAL

## 2023-03-22 NOTE — TELEPHONE ENCOUNTER
"Called patient to evaluate response to the ONB that was administered in the office on 3/21/2023.  Patient does report a headache today but states that he is \"fine.\"  Patient will call if any issues arise.      "

## 2023-03-23 ENCOUNTER — APPOINTMENT (OUTPATIENT)
Dept: GENERAL RADIOLOGY | Facility: SURGERY CENTER | Age: 53
End: 2023-03-23
Payer: COMMERCIAL

## 2023-04-06 NOTE — PROGRESS NOTES
Phone visit.  The patient's headaches did not improve with topiramate 25 mg despite being totally compliant.  Has recently stopped this.  Treximet still works on a as needed basis.  Having a lot of neck pain as well.           Govind was seen today for migraine and neck pain.    Diagnoses and all orders for this visit:    Intractable migraine without aura and without status migrainosus    Neck pain    Other orders  -     TREXIMET  MG per tablet; Take 1 tablet by mouth Every 2 (Two) Hours As Needed for Migraine. Take one tablet at onset of headache  -     Ketorolac Tromethamine (Sprix) 15.75 MG/SPRAY solution; 1 spray into the nostril(s) as directed by provider As Needed (headache).    We discussed multiple alternatives including increasing Topamax, diclofenac, Zanaflex, and stopping Crestor.  However I feel the best way to control this with the least amount of side effects is using Sprix nasal spray.  Recommend phone follow-up in 6 weeks.  Thank you for allowing me to share in the care of this patient.  Austin Anthony M.D.      You have chosen to receive care through a telephone visit. Do you consent to use a telephone visit for your medical care today? Yes  This visit has been rescheduled as a phone visit to comply with patient safety concerns in accordance with CDC recommendations. Total time of discussion was 20 minutes.    
No
Implemented All Universal Safety Interventions:  [place] to call system. Call bell, personal items and telephone within reach. Instruct patient to call for assistance. Room bathroom lighting operational. Non-slip footwear when patient is off stretcher. Physically safe environment: no spills, clutter or unnecessary equipment. Stretcher in lowest position, wheels locked, appropriate side rails in place.

## 2023-04-11 ENCOUNTER — TELEPHONE (OUTPATIENT)
Dept: PAIN MEDICINE | Facility: CLINIC | Age: 53
End: 2023-04-11

## 2023-04-11 NOTE — TELEPHONE ENCOUNTER
Caller: PATIENT      Relationship to patient: SELF    Best call back number: [phone]          Type of visit:FOLLOW UP AFTER PROCEDURE ON 04/13/23     Additional notes:PT. HAS HIS SECOND MEDIAL BRANCH BLOCK ON 04/13/23.   ASKING IF HE NEEDS TO SCHEDULE AN IN OFFICE FOLLOW UP AFTER THIS.   PLEASE CALL TO ADVISE

## 2023-04-11 NOTE — SIGNIFICANT NOTE
Patient educated on the following :    - If you are receiving Sedation for your procedure Nothing to Eat 6 hours and only clear liquids for 2 hours prior to your procedure.    -You will need to have someone drive you home after your PROCEDURE and remain with you for 24 hours after the PROCEDURE  - The date of your procedure, your are welcome to have one visitor at bedside or remain within 10-15 minutes of [facility]  -You will need to arrive at 1445 on 4/13/23 PROCEDURE  -Please contact [facility] at: [phone] with any questions and/or concerns

## 2023-04-13 ENCOUNTER — HOSPITAL ENCOUNTER (OUTPATIENT)
Facility: SURGERY CENTER | Age: 53
Setting detail: HOSPITAL OUTPATIENT SURGERY
Discharge: HOME | End: 2023-04-13
Attending: ANESTHESIOLOGY | Admitting: ANESTHESIOLOGY
Payer: COMMERCIAL

## 2023-04-13 ENCOUNTER — HOSPITAL ENCOUNTER (OUTPATIENT)
Dept: GENERAL RADIOLOGY | Facility: SURGERY CENTER | Age: 53
End: 2023-04-13
Payer: COMMERCIAL

## 2023-04-13 VITALS
DIASTOLIC BLOOD PRESSURE: 107 MMHG | BODY MASS INDEX: 29.03 KG/M2 | WEIGHT: 185 LBS | RESPIRATION RATE: 16 BRPM | HEART RATE: 67 BPM | HEIGHT: 67 IN | SYSTOLIC BLOOD PRESSURE: 163 MMHG | OXYGEN SATURATION: 98 % | TEMPERATURE: 98.4 F

## 2023-04-13 DIAGNOSIS — Z41.9 SURGERY, ELECTIVE: ICD-10-CM

## 2023-04-13 PROCEDURE — 64490 INJ PARAVERT F JNT C/T 1 LEV: CPT | Performed by: ANESTHESIOLOGY

## 2023-04-13 PROCEDURE — 25510000001 IOPAMIDOL 61 % SOLUTION 30 ML VIAL: Performed by: ANESTHESIOLOGY

## 2023-04-13 PROCEDURE — 64491 INJ PARAVERT F JNT C/T 2 LEV: CPT | Performed by: ANESTHESIOLOGY

## 2023-04-13 PROCEDURE — 77002 NEEDLE LOCALIZATION BY XRAY: CPT

## 2023-04-13 PROCEDURE — S0260 H&P FOR SURGERY: HCPCS | Performed by: ANESTHESIOLOGY

## 2023-04-13 PROCEDURE — 76000 FLUOROSCOPY <1 HR PHYS/QHP: CPT

## 2023-04-13 RX ORDER — SODIUM CHLORIDE 0.9 % (FLUSH) 0.9 %
10 SYRINGE (ML) INJECTION AS NEEDED
Status: DISCONTINUED | OUTPATIENT
Start: 2023-04-13 | End: 2023-04-13 | Stop reason: HOSPADM

## 2023-04-13 RX ORDER — SODIUM CHLORIDE 0.9 % (FLUSH) 0.9 %
10 SYRINGE (ML) INJECTION EVERY 12 HOURS SCHEDULED
Status: DISCONTINUED | OUTPATIENT
Start: 2023-04-13 | End: 2023-04-13 | Stop reason: HOSPADM

## 2023-04-13 NOTE — H&P
Brief Pre-procedural / Pre-operative H&P        -----    Pertinent Diagnosis:   Cervical spondylosis.  Cervical spine arthropathy    Proposed Procedure: Cervical medial branch blockade      Subjective   Govind Manriquez is a 52 y.o. male  who presents for intervention.  He has a history of neck pain.      History of Present Illness     He has a history of neck pain and a history of axial component to that.  Previously we had identified the most painful area under fluoroscopy and it was over there right C4-C5 and right C5-C6 area so we focused on the right C4 and C5 and C6 medial branches.  He had 80% relief diagnostically and improve range of motion during the diagnostic window.    It is of note that he has some left-sided pain also although he has quite a bit more pain on the right.  He recalls that with his 80% diagnostic relief that the residual pain that he had during that time was all left-sided.  Furthermore he has cervicogenic headaches.  He requested bilateral procedure which is a reasonable request especially with known spondylosis.    -------    The following portions of the patient's history were reviewed and updated as appropriate: allergies, current medications, past family history, past medical history, past social history, past surgical history and problem list.    Allergies   Allergen Reactions   • Zocor [Simvastatin] Anaphylaxis     fatigue         Current Facility-Administered Medications:   •  sodium chloride 0.9 % flush 10 mL, 10 mL, Intravenous, Q12H, Brendan Glez MD  •  sodium chloride 0.9 % flush 10 mL, 10 mL, Intravenous, PRN, Brendan Glez MD    No current facility-administered medications on file prior to encounter.     Current Outpatient Medications on File Prior to Encounter   Medication Sig Dispense Refill   • baclofen (LIORESAL) 10 MG tablet TAKE 1 TABLET BY MOUTH THREE TIMES A DAY AS NEEDED FOR MUSCLE SPASM 90 tablet 3   • SUMAtriptan (Imitrex) 100 MG tablet Take one tablet  at onset of headache. May repeat dose one time in 2 hours if headache not relieved. 27 tablet 3   • topiramate (TOPAMAX) 25 MG capsule (sprinkle) Take 1 capsule by mouth Daily. 90 capsule 3   • Evolocumab (REPATHA) solution prefilled syringe injection Inject 1 mL under the skin into the appropriate area as directed Every 14 (Fourteen) Days. 6 mL 3       Patient Active Problem List   Diagnosis   • Hyperlipidemia   • Cervical radiculopathy   • Neck pain   • Migraine without aura, not intractable   • Tension headache   • Chronic bilateral thoracic back pain   • Chronic bilateral low back pain without sciatica   • Snoring   • Hypersomnia   • Cervical facet syndrome       Past Medical History:   Diagnosis Date   • GERD (gastroesophageal reflux disease)    • Hyperlipidemia    • Migraine    • Neck pain    • Observed sleep apnea 10/15/2020       Past Surgical History:   Procedure Laterality Date   • KNEE CARTILAGE SURGERY Left    • MEDIAL BRANCH BLOCK Bilateral 12/2/2022    Procedure: CERVICAL MEDIAL BRANCH BLOCK - likely C4 C5 C6 bilateral - x2, 2-3 weeks apart;  Surgeon: Brendan Glez MD;  Location: [facility] MAIN OR;  Service: Pain Management;  Laterality: Bilateral;       Family History   Problem Relation Age of Onset   • Heart disease Father 56        started with CAD in 30s non-smoker but obese   • Heart failure Father    • Stroke Paternal Grandmother        Social History     Socioeconomic History   • Marital status:      Spouse name: Natasha Manriquez   • Number of children: 1   Tobacco Use   • Smoking status: Never   • Smokeless tobacco: Never   Substance and Sexual Activity   • Alcohol use: No   • Drug use: No   • Sexual activity: Defer       -------       Review of Systems  No Fever, No Chills, No ear pain, No sinus pressure or drainage, No eye pain or drainage, No cough, No SOB, No chest tightness nor chest pain, no palpitations.          Vitals:    03/21/23 1400 04/13/23 1513   BP:  142/97   BP Location:   "Left arm   Patient Position:  Sitting   Pulse:  69   Resp:  16   Temp:  97.5 °F (36.4 °C)   TempSrc:  Temporal   SpO2:  98%   Weight: 83.9 kg (185 lb) 83.9 kg (185 lb)   Height: 175.3 cm (69\") 170.2 cm (67\")         Objective   Physical Exam  VSS, NNR, NCAT, NMNA, NRD, AAOx3.      -------    Assessment & Plan:  - as noted above, the stated intervention is indicated  - Follow-up plan will be noted in the operative report        OV 4-      EMR Dragon/Transcription disclaimer:   Typed items in this encounter note may have been created by electronic transcription/translation software which converts spoken language to printed text. The electronic translation of spoken language may permit erroneous, or at times, nonsensical words or phrases to be inadvertently transcribed; Although I have reviewed the note for such errors, some may still exist.      "

## 2023-04-13 NOTE — OP NOTE
Cervical Medial Branch Blockade  [facility]      PREOPERATIVE DIAGNOSIS:  Cervical spondylosis without myelopathy   POSTOPERATIVE DIAGNOSIS:  Same as preoperative diagnosis    PROCEDURE:    Cervical Facet Nerve (medial branch) Blocks, with Fluoroscopy:      LEVELS: bilateral  C4, C5 and C6    PRE-PROCEDURE DISCUSSION WITH PATIENT:    Risks and complications were discussed with the patient prior to starting the procedure and informed consent was obtained.  We discussed various topics, including but not limited to bleeding, infection, injury, nerve injury, paralysis, coma, death, postprocedural soreness or painful flare, worsening of clinical picture, lack of pain relief.  We discussed the diagnostic nature of medial branch blockades.      SURGEON:  Brendan Glez MD    REASON FOR PROCEDURE:    The patient complains of pain that seems to have a significant axial component Painful area identified on exam under fluoroscopy Headaches are noted which seem to have a cervicogenic component    SEDATION:  Patient declined administration of moderate sedation   and , but an IV access was obtained for safety.  ANESTHETIC:   Marcaine 0.25%  STEROID:  NONE  TOTAL VOLUME OF SOLUTION:  6 mL      DESCRIPTON OF PROCEDURE:  After obtaining informed consent, the patient was placed in the prone position. IV access was obtained.  EKG, blood pressure, and pulse oximeter were monitored by an RN under my direct guidance.  The cervical area was prepped with Chloraprep and draped with sterile barrier.     Under fluoroscopic guidance the waists of the above mentioned vertebra were identified and marked.  Skin and subcutaneous tissue were then anesthetized with 1% lidocaine 1mL at each point. Then spinal needles were introduced under fluoroscopic guidance at the waists of these vertebrae contacting periosteum on the lateral edge of the vertebra on the AP fluroscopic view. After confirming the position of the needle under  fluoroscopic PA and lateral views, confirming the needle position in the center of the trapezoid at each level, and confirming negative aspiration of blood and CSF, 0.25 mL of Omnipaque was injected.  Proper spread and lack of vascular uptake was demonstrated.  A solution was prepared as above, and 1 mL of that solution was injected very slowly each level.      Needles were removed intact from all levels.  Vitals were stable throughout.       ESTIMATED BLOOD LOSS:  minimal  SPECIMENS:  None    COMPLICATIONS:    No complications were noted., There was no indication of vascular uptake on live injection of contrast dye., There was no indication of intrathecal uptake on live injection of contrast dye., There was not any evidence of dural puncture.   and The patient did not have any signs of postprocedure numbness nor weakness.    TOLERANCE & DISCHARGE CONDITION:    The patient tolerated the procedure well.  The patient was transported to the recovery area without difficulties.  The patient was discharged to home under the care of family in stable and satisfactory condition.        PLAN OF CARE:  1. The patient was given our standard instruction sheet.  2. We discussed that Cervical Medial Branch Blockade is a diagnostic procedure in consideration for radiofrequency ablation if two diagnostic procedures proved to be positive for significant benefit.  If sustained relief of six to eight weeks is obtained, then an alternative plan could be therapeutic cervical medial branch blocks on an as-needed basis.  3. The patient is asked to keep a pain log hourly for 8 hours postoperatively today.  4. The patient will Return to clinic 1 wk.  5. The patient will resume all medications as per the medication reconciliation sheet.

## 2023-04-13 NOTE — DISCHARGE INSTRUCTIONS
[facility] [place] [facility] - Post-procedure Instructions          --  While there are no absolute restrictions, it is recommended that you do not perform strenuous activity today. In the morning, you may resume your level of activity as before your block.    --  If you have a band-aid at your injection site, please remove it later today. Observe the area for any redness, swelling, pus-like drainage, or a temperature over 101°. If any of these symptoms occur, please call your doctor at [phone]. If after office hours, leave a message and the on-call provider will return your call.    --  Ice may be applied to your injection site. It is recommended you avoid direct heat (heating pad; hot tub) for 1-2 days.    --  Call [facility] [place] at [phone] if you experience persistent headache, persistent bleeding from the injection site, or severe pain not relieved by heat or oral medication.    --  Do not make important decisions today.    --  Due to the effects of the block and/or the I.V. Sedation, DO NOT drive or operate hazardous machinery for 12 hours.  Local anesthetics may cause numbness after procedure and precautions must be taken with regards to operating equipment as well as with walking, even if ambulating with assistance of another person or with an assistive device.    --  Do not drink alcohol for 12 hours.    -- You may return to work tomorrow, or as directed by your referring doctor.    --  Occasionally you may notice a slight increase in your pain after the procedure. This should start to improve within the next 24-48 hours. Radiofrequency ablation procedure pain may last 3-4 weeks.    --  It may take as long as 3-4 days before you notice a gradual improvement in your pain and/or other symptoms.    -- You may continue to take your prescribed pain medication as needed.    --  Some normal possible side effects of steroid use could include fluid retention, increased blood sugar, dull headache,  increased sweating, increased appetite, mood swings and flushing.    --  Diabetics are recommended to watch their blood glucose level closely for 24-48 hours after the injection.    --  Must stay in PACU for 20 min upon arrival and prove no leg weakness before being discharged.    --  IN THE EVENT OF A LIFE THREATENING EMERGENCY, (CHEST PAIN, BREATHING DIFFICULTIES, PARALYSIS…) YOU SHOULD GO TO YOUR NEAREST EMERGENCY ROOM.    --  You should be contacted by our office within 2-3 days to schedule follow up or next appointment date.  If not contacted within 7 days, please call the office at [phone]

## 2023-04-20 ENCOUNTER — OFFICE VISIT (OUTPATIENT)
Dept: PAIN MEDICINE | Facility: CLINIC | Age: 53
End: 2023-04-20
Payer: COMMERCIAL

## 2023-04-20 ENCOUNTER — PREP FOR SURGERY (OUTPATIENT)
Dept: SURGERY | Facility: SURGERY CENTER | Age: 53
End: 2023-04-20
Payer: COMMERCIAL

## 2023-04-20 VITALS
DIASTOLIC BLOOD PRESSURE: 95 MMHG | WEIGHT: 186.6 LBS | TEMPERATURE: 98 F | HEART RATE: 79 BPM | OXYGEN SATURATION: 98 % | BODY MASS INDEX: 29.29 KG/M2 | SYSTOLIC BLOOD PRESSURE: 131 MMHG | HEIGHT: 67 IN

## 2023-04-20 DIAGNOSIS — M54.12 CERVICAL RADICULOPATHY: ICD-10-CM

## 2023-04-20 DIAGNOSIS — M47.812 CERVICAL FACET SYNDROME: Primary | ICD-10-CM

## 2023-04-20 RX ORDER — SODIUM CHLORIDE 0.9 % (FLUSH) 0.9 %
10 SYRINGE (ML) INJECTION AS NEEDED
OUTPATIENT
Start: 2023-04-20

## 2023-04-20 RX ORDER — SODIUM CHLORIDE 0.9 % (FLUSH) 0.9 %
10 SYRINGE (ML) INJECTION EVERY 12 HOURS SCHEDULED
OUTPATIENT
Start: 2023-04-20

## 2023-04-20 NOTE — PROGRESS NOTES
CHIEF COMPLAINT  Neck pain    Subjective   Govind Manriquez is a 52 y.o. male  who presents to the office for follow-up of procedure.  He completed a Bilateral C4-C6 Cervical MBB on 4/13/23 performed by Dr. Glez for management of neck pain. Patient reports 80% relief from the procedure x 1 day. He reports improved ROM during this time.     Today pain is 1/10VAS in severity. Pain on both sides of his neck (Right > Left) and radiates into occipital region. Describes this pain as a nearly continuous ache with intermittent sharp pains.  He continues to report intermittent headaches which he manages with Imitrex. Pain is worsened by prolonged position, especially when sleeping and bending/twisting. Pain improves with rest/repositioning, heat/ice, Tylenol or Ibuprofen as needed, Baclofen as needed for muscle spasm and procedures.  He has recently been reevaluated by chiropractor and plans to go as needed.    History of KIM with no relief. He has also had Occipital Nerve Blocks x 2 with SITA Calvillo with no relief.    Procedures:  4/13/23 - Bilateral C4-C6 Cervical MBB - 80% relief x 1 day  12/2/22 - Right C4-C6 Cervical MBB - 80% relief x 3 days    Neck Pain   This is a chronic problem. The current episode started more than 1 year ago. The problem occurs constantly. The problem has been waxing and waning. The pain is associated with an unknown factor. The pain is present in the right side, occipital region, left side and midline. The quality of the pain is described as aching (intermittent sharp pains down right side). The pain is at a severity of 1/10. The symptoms are aggravated by position, twisting and bending. Associated symptoms include headaches. Pertinent negatives include no chest pain, fever, numbness or weakness. He has tried chiropractic manipulation, ice, heat, muscle relaxants, NSAIDs and acetaminophen for the symptoms. The treatment provided mild relief.      PEG Assessment   What number best  describes your pain on average in the past week?8  What number best describes how, during the past week, pain has interfered with your enjoyment of life?10  What number best describes how, during the past week, pain has interfered with your general activity?  7    Review of Pertinent Medical Data ---  Reviewed office note from SANTINO Rowley from 12/8/2022.  Patient presents for follow-up of procedure.  He completed a right C4-C6 cervical MBB on 12/2/2022 performed by Dr. Glez for management of neck pain.  Patient reports 80% relief from the procedure for 3 days with improved range of motion.  Continues to report intermittent migraines and states he currently has a headache.  Plan was to proceed with a second cervical MBB injections with goal of RFA.    MRI examination of the thoracic spine with and without contrast, MRI  cervical spine with and without contrast     HISTORY right-sided neck pain, headaches.     COMPARISON: MRI cervical spine 11/16/2018 and MRI thoracic spine  03/22/2019.     MRI examination of the cervical spine with and without contrast: The  alignment of the cervical spine is within normal limits. Signal  intensity within the cervical cord is normal on the sagittal T2  sequence.     C2-3: There is no evidence of disc bulge or herniation.     C3-4: Mild neural foraminal, as is present on the left and there is  moderate neural foraminal, as on the right secondary to uncovertebral  degenerative disease. This is similar to the prior examination.     C4-5: Mild to moderate facet degenerative disease is present on the  right and left respectively. There is mild and mild to moderate neural  foraminal, as on the right and left respectively secondary to  uncovertebral degenerative disease and facet hypertrophy. This appears  similar to the prior examination.     C5-6: Mild facet degenerative disease is present bilaterally..     C6-7: There is no evidence of disc bulge or herniation.     C7-T1: There  is no evidence of disc bulge or herniation.     After contrast administration enhancement involving the facets to the  left are noted at C3-4.     IMPRESSION:  No evidence of disc herniation, cord signal abnormality or  of cord compression. Mild to moderate degenerative disease involving the  cervical spine is noted as described above most prominent to the left at  C3-4 where there is also mild to moderate enhancement involving the  facets. The appearance is similar to the MRI examination of 11/16/2018.  See above.        MRI examination of the thoracic spine with and without contrast:     The alignment of the thoracic spine is within normal limits. Signal  intensity within the thoracic cord is normal on the sagittal T2  sequence. Signal loss involving the T10 vertebral body is appreciated on  the sagittal T1 sequence. This is T2 hyperintense and enhances mildly  after contrast demonstration. It is unchanged as compared to the  examination of 03/20/2019 but new versus the MRI examination of  03/06/2007. There was no evidence of abnormal activity on the bone scan  performed on 04/16/2019. The stability and appearance is therefore  consistent with an atypical hemangioma.     There are stones of disc bulge, herniation or of central canal stenosis.     IMPRESSION: No evidence of disc herniation, canal stenosis or of cord  signal abnormality. An atypical hemangioma is appreciated involving the  T10 vertebral body unchanged versus the MRI examination thoracic spine  from 03/22/2019.     This report was finalized on 5/12/2021 11:42 AM by Dr. Ketan Peoples M.D.    The following portions of the patient's history were reviewed and updated as appropriate: allergies, current medications, past family history, past medical history, past social history, past surgical history and problem list.    Review of Systems   Constitutional: Positive for fatigue. Negative for activity change, chills and fever.   HENT: Negative for  "congestion.    Eyes: Negative for visual disturbance.   Respiratory: Negative for chest tightness and shortness of breath.    Cardiovascular: Negative for chest pain.   Gastrointestinal: Negative for abdominal pain, constipation and diarrhea.   Genitourinary: Negative for difficulty urinating and dysuria.   Musculoskeletal: Positive for neck pain. Negative for back pain.   Neurological: Positive for headaches. Negative for dizziness, weakness, light-headedness and numbness.   Psychiatric/Behavioral: Positive for agitation and sleep disturbance. The patient is not nervous/anxious.      I have reviewed and confirmed the accuracy of the ROS as documented by the MA/LPN/RN SITA Neves     Vitals:    04/20/23 0801   BP: 131/95   Pulse: 79   Temp: 98 °F (36.7 °C)   SpO2: 98%   Weight: 84.6 kg (186 lb 9.6 oz)   Height: 170.2 cm (67\")   PainSc:   1   PainLoc: Neck     Objective   Physical Exam  Constitutional:       Appearance: Normal appearance.   HENT:      Head: Normocephalic.   Cardiovascular:      Rate and Rhythm: Normal rate and regular rhythm.   Pulmonary:      Effort: Pulmonary effort is normal.      Breath sounds: Normal breath sounds.   Musculoskeletal:      Cervical back: Spasms and tenderness present. Spinous process tenderness and muscular tenderness present. Decreased range of motion.      Comments: + cervical flexion/extension compression test   Skin:     General: Skin is warm and dry.      Capillary Refill: Capillary refill takes less than 2 seconds.   Neurological:      General: No focal deficit present.      Mental Status: He is alert and oriented to person, place, and time.   Psychiatric:         Mood and Affect: Mood normal.         Behavior: Behavior normal.         Thought Content: Thought content normal.         Cognition and Memory: Cognition normal.       Assessment & Plan   Diagnoses and all orders for this visit:    1. Cervical facet syndrome (Primary)    2. Cervical radiculopathy    --- " "Bilateral C4-C6 RFA   -------  Education about Medial Branch Blockade and RF Therapy:  This medial branch blockade (MBB) suggested is intended for diagnostic purposes, with the intent of offering the patient Radiofrequency thermal rhizotomy (RF) if the MBB is diagnostically effective.  The diagnostic blockade is necessary to determine the likelihood that RF therapy could be efficacious in providing long term relief to the patient.    Medial branches are sensory nerve branches that connect to a facet joint and transmit sensations & pain signals from that joint.  Facet is a term for the type of joints found in the spine.  Medial branches are the nerves that go to a facet, and therefore are also sometimes called \"facet joint nerves\" (FJNs).      In a medial branch blockade procedure, xray fluoroscopy is used to verify the locations of the outside of the joint lines which are being targeted.  Under xray guidance, needles are placed to these areas.  Contrast dye is injected to confirm proper placement, with dye flowing over the joint area, and to ensure that the dye does not flow into unintended areas such as a vein.  When this is confirmed, local anesthetic is injected to block the medial branch at that joint level.      If MBBs are diagnostically successful in blocking pain, then the patient is most likely a great candidate for Radiofrequency of those facet joint nerves.  In the RF procedure, needles are placed to the joint lines in the same fashion, and after testing, the needle tips are heated to thermally treat the nerves, blocking the nerves by in essence damaging the nerves with the heat treatment(non-pulsed).       Medically, a successful RF procedure should provide a patient with 50% pain relief or more for at least 6 months.  Clinical experience suggests that successful patients receive relief more in the range of 12 months on average.  We also discussed that a fortunate minority of patients receive therapeutic " success from the MBB, and may not require RF ablation.  If a patient receives more than 8 weeks of relief from MBB, then occasional repeat MBB for therapeutic purposes is a very reasonable alternative therapy.  This course of therapy is consistent with our LCDs according to our CMS  in the area, and therefore other insurance providers should follow accordingly.  We will monitor our patients to screen for these therapeutic responders and will offer RF therapy only when necessary.      We discussed that MBB & RF are not without risks.  Guidelines regarding anticoagulant use & neuraxial procedures will be respected.  Patients that are ill or otherwise may be at risk for sepsis will not have their spines accessed by neuraxial injections of any type.  This patient will not be offered these therapies if there is an increased risk.   We discussed that there is a risk of postprocedural pain and also a risk of worsening of clinical picture with these procedures as with any neuraxial procedure.    -------  Discussed with the patient that sedation is optional for this procedure.  The sedation offered is called conscious sedation which is different from general anesthesia that is utilized in surgical procedures. The dosing of the sedation is determined by the physician and they will be monitored throughout the procedure. With conscious sedation it is possible to remember parts or all of the procedure, this is normal. They will need to have a  with them as driving is prohibited following conscious sedation.      NPO instructions for conscious sedation:  --- Do not eat 6 hours prior to the procedure.   --- Do not drink any dairy or citrus 4 hours prior to the procedure.   --- Do not drink anything, including clear liquids, 2 hours prior to procedure.      If the NPO instructions are not followed then the procedure may be performed without sedation or the procedure will need to be rescheduled.   --- Follow-up  for procedure     ANTELMO REPORT  As the clinician, I personally reviewed the ANTELMO from 4/20/23 while the patient was in the office today.    Dictated utilizing Dragon dictation.

## 2023-04-20 NOTE — PATIENT INSTRUCTIONS
"-------  Education about Medial Branch Blockade and RF Therapy:    This medial branch blockade (MBB) suggested is intended for diagnostic purposes, with the intent of offering the patient Radiofrequency thermal rhizotomy (RF) if the MBB is diagnostically effective.  The diagnostic blockade is necessary to determine the likelihood that RF therapy could be efficacious in providing long term relief to the patient.    Medial branches are sensory nerve branches that connect to a facet joint and transmit sensations & pain signals from that joint.  Facet is a term for the type of joints found in the spine.  Medial branches are the nerves that go to a facet, and therefore are also sometimes called \"facet joint nerves\" (FJNs).      In a medial branch blockade procedure, xray fluoroscopy is used to verify the locations of the outside of the joint lines which are being targeted.  Under xray guidance, needles are placed to these areas.  Contrast dye is injected to confirm proper placement, with dye flowing over the joint area, and to ensure that the dye does not flow into unintended areas such as a vein.  When this is confirmed, local anesthetic is injected to block the medial branch at that joint level.      If MBBs are diagnostically successful in blocking pain, then the patient is most likely a great candidate for Radiofrequency of those facet joint nerves.  In the RF procedure, needles are placed to the joint lines in the same fashion, and after testing, the needle tips are heated to thermally treat the nerves, blocking the nerves by in essence damaging the nerves with the heat treatment(non-pulsed).       Medically, a successful RF procedure should provide a patient with 50% pain relief or more for at least 6 months.  Clinical experience suggests that successful patients receive relief more in the range of 12 months on average.  We also discussed that a fortunate minority of patients receive therapeutic success from the " MBB, and may not require RF ablation.  If a patient receives more than 8 weeks of relief from MBB, then occasional repeat MBB for therapeutic purposes is a very reasonable alternative therapy.  This course of therapy is consistent with our LCDs according to our CMS  in the area, and therefore other insurance providers should follow accordingly.  We will monitor our patients to screen for these therapeutic responders and will offer RF therapy only when necessary.      We discussed that MBB & RF are not without risks.  Guidelines regarding anticoagulant use & neuraxial procedures will be respected.  Patients that are ill or otherwise may be at risk for sepsis will not have their spines accessed by neuraxial injections of any type.  This patient will not be offered these therapies if there is an increased risk.   We discussed that there is a risk of postprocedural pain and also a risk of worsening of clinical picture with these procedures as with any neuraxial procedure.    -------  Discussed with the patient that sedation is optional for this procedure.  The sedation offered is called conscious sedation which is different from general anesthesia that is utilized in surgical procedures. The dosing of the sedation is determined by the physician and they will be monitored throughout the procedure. With conscious sedation it is possible to remember parts or all of the procedure, this is normal. They will need to have a  with them as driving is prohibited following conscious sedation.      NPO instructions for conscious sedation:  --- Do not eat 6 hours prior to the procedure.   --- Do not drink any dairy or citrus 4 hours prior to the procedure.   --- Do not drink anything, including clear liquids, 2 hours prior to procedure.      If the NPO instructions are not followed then the procedure may be performed without sedation or the procedure will need to be rescheduled.

## 2023-05-03 ENCOUNTER — TRANSCRIBE ORDERS (OUTPATIENT)
Dept: SURGERY | Facility: SURGERY CENTER | Age: 53
End: 2023-05-03
Payer: COMMERCIAL

## 2023-05-03 DIAGNOSIS — Z41.9 SURGERY, ELECTIVE: Primary | ICD-10-CM

## 2023-06-07 NOTE — DISCHARGE INSTRUCTIONS
[facility] [place] [facility] - Post-procedure Instructions          --  While there are no absolute restrictions, it is recommended that you do not perform strenuous activity today. In the morning, you may resume your level of activity as before your block.    --  If you have a band-aid at your injection site, please remove it later today. Observe the area for any redness, swelling, pus-like drainage, or a temperature over 101°. If any of these symptoms occur, please call your doctor at [phone]. If after office hours, leave a message and the on-call provider will return your call.    --  Ice may be applied to your injection site. It is recommended you avoid direct heat (heating pad; hot tub) for 1-2 days.    --  Call [facility] at [phone] if you experience persistent headache, persistent bleeding from the injection site, or severe pain not relieved by heat or oral medication.    --  Do not make important decisions today.    --  Due to the effects of the block and/or the I.V. Sedation, DO NOT drive or operate hazardous machinery for 12 hours.  Local anesthetics may cause numbness after procedure and precautions must be taken with regards to operating equipment as well as with walking, even if ambulating with assistance of another person or with an assistive device.    --  Do not drink alcohol for 12 hours.    -- You may return to work tomorrow, or as directed by your referring doctor.    --  Occasionally you may notice a slight increase in your pain after the procedure. This should start to improve within the next 24-48 hours. Radiofrequency ablation procedure pain may last 3-4 weeks.    --  It may take as long as 3-4 days before you notice a gradual improvement in your pain and/or other symptoms.    -- You may continue to take your prescribed pain medication as needed.    --  Some normal possible side effects of steroid use could include fluid retention, increased blood sugar, dull headache,  increased sweating, increased appetite, mood swings and flushing.    --  Diabetics are recommended to watch their blood glucose level closely for 24-48 hours after the injection.    --  Must stay in PACU for 20 min upon arrival and prove no leg weakness before being discharged.    --  IN THE EVENT OF A LIFE THREATENING EMERGENCY, (CHEST PAIN, BREATHING DIFFICULTIES, PARALYSIS…) YOU SHOULD GO TO YOUR NEAREST EMERGENCY ROOM.    --  You should be contacted by our office within 2-3 days to schedule follow up or next appointment date.  If not contacted within 7 days, please call the office at [phone]

## 2023-06-08 ENCOUNTER — HOSPITAL ENCOUNTER (OUTPATIENT)
Facility: SURGERY CENTER | Age: 53
Setting detail: HOSPITAL OUTPATIENT SURGERY
Discharge: HOME | End: 2023-06-08
Attending: ANESTHESIOLOGY | Admitting: ANESTHESIOLOGY
Payer: COMMERCIAL

## 2023-06-08 ENCOUNTER — HOSPITAL ENCOUNTER (OUTPATIENT)
Dept: GENERAL RADIOLOGY | Facility: SURGERY CENTER | Age: 53
Setting detail: HOSPITAL OUTPATIENT SURGERY
End: 2023-06-08
Payer: COMMERCIAL

## 2023-06-08 VITALS
BODY MASS INDEX: 29.19 KG/M2 | HEART RATE: 82 BPM | SYSTOLIC BLOOD PRESSURE: 132 MMHG | WEIGHT: 186 LBS | TEMPERATURE: 98.4 F | RESPIRATION RATE: 20 BRPM | HEIGHT: 67 IN | OXYGEN SATURATION: 95 % | DIASTOLIC BLOOD PRESSURE: 101 MMHG

## 2023-06-08 DIAGNOSIS — Z41.9 SURGERY, ELECTIVE: ICD-10-CM

## 2023-06-08 DIAGNOSIS — M47.812 CERVICAL FACET SYNDROME: ICD-10-CM

## 2023-06-08 PROCEDURE — 64634 DESTROY C/TH FACET JNT ADDL: CPT | Performed by: ANESTHESIOLOGY

## 2023-06-08 PROCEDURE — 25010000002 MIDAZOLAM PER 1 MG: Performed by: ANESTHESIOLOGY

## 2023-06-08 PROCEDURE — 64633 DESTROY CERV/THOR FACET JNT: CPT | Performed by: ANESTHESIOLOGY

## 2023-06-08 PROCEDURE — 25010000002 FENTANYL CITRATE (PF) 50 MCG/ML SOLUTION: Performed by: ANESTHESIOLOGY

## 2023-06-08 PROCEDURE — 76000 FLUOROSCOPY <1 HR PHYS/QHP: CPT

## 2023-06-08 RX ORDER — MIDAZOLAM HYDROCHLORIDE 1 MG/ML
INJECTION INTRAMUSCULAR; INTRAVENOUS AS NEEDED
Status: DISCONTINUED | OUTPATIENT
Start: 2023-06-08 | End: 2023-06-08 | Stop reason: HOSPADM

## 2023-06-08 RX ORDER — SODIUM CHLORIDE 0.9 % (FLUSH) 0.9 %
10 SYRINGE (ML) INJECTION EVERY 12 HOURS SCHEDULED
Status: DISCONTINUED | OUTPATIENT
Start: 2023-06-08 | End: 2023-06-08 | Stop reason: HOSPADM

## 2023-06-08 RX ORDER — SODIUM CHLORIDE 0.9 % (FLUSH) 0.9 %
10 SYRINGE (ML) INJECTION AS NEEDED
Status: DISCONTINUED | OUTPATIENT
Start: 2023-06-08 | End: 2023-06-08 | Stop reason: HOSPADM

## 2023-06-08 RX ORDER — FENTANYL CITRATE 50 UG/ML
INJECTION, SOLUTION INTRAMUSCULAR; INTRAVENOUS AS NEEDED
Status: DISCONTINUED | OUTPATIENT
Start: 2023-06-08 | End: 2023-06-08 | Stop reason: HOSPADM

## 2023-06-08 NOTE — OP NOTE
Cervical Facet Nerve (Medial Branch) Radiofrequency Lesioning  [facility]      PREOPERATIVE DIAGNOSIS:  Cervical spondylosis without myelopathy   POSTOPERATIVE DIAGNOSIS:  Same as preoperative diagnosis    PROCEDURE:    Cervical Facet Nerve (medial branch) RF, with Fluoroscopy:      LEVELS: bilateral  C4, C5, and C6    PRE-PROCEDURE DISCUSSION WITH PATIENT:    Risks and complications were discussed with the patient prior to starting the procedure and informed consent was obtained.  We discussed various topics, including but not limited to bleeding, infection, injury, nerve injury, paralysis, coma, death, postprocedural soreness or painful flare, worsening of clinical picture, lack of pain relief.  We discussed the previous positive diagnostic nature of medial branch blockades.      SURGEON:  Brendan Glez MD    REASON FOR PROCEDURE:    The patient presents with chronic axial neck pain. The patient underwent 2 bilateral cervical medial branch blocks with diagnostically positive relief. Given the patient’s significant pain relief, it is diagnostic that we have likely found the source of the patient’s pain; therefore, radiofrequency ablation of those nerves has been indicated for therapeutic pain relief.    SEDATION:  Versed 2mg & Fentanyl 50 mcg IV  TIME OF PROCEDURE:  After administration of the IV sedative, the intraoperative procedure time was 21 minutes.      ANESTHETIC:   Lidocaine 2%  STEROID:   NONE  TOTAL VOLUME OF SOLUTION:  6 ml      DESCRIPTON OF PROCEDURE:  After obtaining informed consent, the patient was placed in the prone position. IV access was obtained.  EKG, blood pressure, and pulse oximeter were monitored and any & all sedation was administered by an RN under my direct guidance.  The cervical area was prepped with Chloraprep and draped with sterile barrier.     Under fluoroscopic guidance the waists of the above mentioned vertebra were identified and marked at the lateral  margin of the vertebrae on the AP fluoroscopic view.  Skin and subcutaneous tissue were then anesthetized with 1% lidocaine 1mL at each point.  Then RF cannula needles were sequentially introduced under fluoroscopic guidance to the waists of these vertebrae contacting periosteum on the lateral edge of the vertebra on the AP fluroscopic view. After confirming the position of the needle under fluoroscopic PA and lateral views, confirming the needle position in the center of the trapezoid at each level, and confirming negative aspiration of blood and CSF, testing was initiated.  There was a lack of radicular stimulation on each needle at 3v and 2Hz.      Then, in each needle, 1mL of the aforementioned local anesthetic was instilled.  After 2 minutes had elapsed, Radiofrequency thermal lesioning was initiated for 2 minutes at 80 degrees Celsius.      Needles were removed intact from all levels.  Vitals were stable throughout.       ESTIMATED BLOOD LOSS:  minimal  SPECIMENS:  None    COMPLICATIONS:    No complications were noted., There was not any evidence of dural puncture.  , and The patient did not have any signs of postprocedure numbness nor weakness.    TOLERANCE & DISCHARGE CONDITION:    The patient tolerated the procedure well.  The patient was transported to the recovery area without difficulties.  The patient was discharged to home under the care of family in stable and satisfactory condition.        PLAN OF CARE:  The patient was given our standard instruction sheet.  We discussed that Cervical Medial Branch Blockade is a diagnostic procedure in consideration for radiofrequency ablation if two diagnostic procedures proved to be positive for significant benefit.  If sustained relief of six to eight weeks is obtained, then an alternative plan could be therapeutic cervical medial branch blocks on an as-needed basis.  The patient is asked to keep a pain log hourly for 8 hours postoperatively today.  The patient  will Return to clinic 6 wks.  The patient will resume all medications as per the medication reconciliation sheet.

## 2023-06-08 NOTE — H&P
Brief Pre-procedural / Pre-operative H&P        -----    Pertinent Diagnosis:   Cervical spondylosis and cervical facet arthropathy    Proposed Procedure: Cervical medial branch radiofrequency ablation anticipated at C4 and C5 and C6, anticipated bilaterally      Subjective   Govind Manriquez is a 52 y.o. male  who presents for intervention.  He has a history of pain.      History of Present Illness     Has a history of bilateral neck pain and has had multiple cervical medial branch blockades that are positive diagnostically.  Noted 80% relief for the day of the procedure the entire day.  Right greater than left bilateral pain that radiates up the neck and is a continuous aching.  Pain increases with sleeping positions and also bending and twisting.  Does not appear to be a radicular component.  The patient does have moderate facet degenerative disease on imaging and displays pain on cervical flexion and extension and also with cervical compression.  -------    The following portions of the patient's history were reviewed and updated as appropriate: allergies, current medications, past family history, past medical history, past social history, past surgical history and problem list.    Allergies   Allergen Reactions    Zocor [Simvastatin] Anaphylaxis     fatigue         Current Facility-Administered Medications:     sodium chloride 0.9 % flush 10 mL, 10 mL, Intravenous, Q12H, Brendan Glez MD    sodium chloride 0.9 % flush 10 mL, 10 mL, Intravenous, PRN, Brendan Glez MD    No current facility-administered medications on file prior to encounter.     Current Outpatient Medications on File Prior to Encounter   Medication Sig Dispense Refill    baclofen (LIORESAL) 10 MG tablet TAKE 1 TABLET BY MOUTH THREE TIMES A DAY AS NEEDED FOR MUSCLE SPASM 90 tablet 3    SUMAtriptan (Imitrex) 100 MG tablet Take one tablet at onset of headache. May repeat dose one time in 2 hours if headache not relieved. 27 tablet 3     topiramate (TOPAMAX) 25 MG capsule (sprinkle) Take 1 capsule by mouth Daily. 90 capsule 3       Patient Active Problem List   Diagnosis    Hyperlipidemia    Cervical radiculopathy    Neck pain    Migraine without aura, not intractable    Tension headache    Chronic bilateral thoracic back pain    Chronic bilateral low back pain without sciatica    Snoring    Hypersomnia    Cervical facet syndrome       Past Medical History:   Diagnosis Date    GERD (gastroesophageal reflux disease)     Hyperlipidemia     Migraine     Neck pain     Observed sleep apnea 10/15/2020       Past Surgical History:   Procedure Laterality Date    KNEE CARTILAGE SURGERY Left     MEDIAL BRANCH BLOCK Bilateral 12/2/2022    Procedure: CERVICAL MEDIAL BRANCH BLOCK - likely C4 C5 C6 bilateral - x2, 2-3 weeks apart;  Surgeon: Brendan Glez MD;  Location: [facility];  Service: Pain Management;  Laterality: Bilateral;    MEDIAL BRANCH BLOCK Bilateral 4/13/2023    Procedure: CERVICAL MEDIAL BRANCH BLOCK BILATERAL C4-C5 45135, 36561;  Surgeon: Brendan Glez MD;  Location: [facility];  Service: Pain Management;  Laterality: Bilateral;       Family History   Problem Relation Age of Onset    Heart disease Father 56        started with CAD in 30s non-smoker but obese    Heart failure Father     Stroke Paternal Grandmother        Social History     Socioeconomic History    Marital status:      Spouse name: Natasha Manriquez    Number of children: 1   Tobacco Use    Smoking status: Never    Smokeless tobacco: Never   Substance and Sexual Activity    Alcohol use: No    Drug use: No    Sexual activity: Defer       -------       Review of Systems  No Fever, No Chills, No ear pain, No sinus pressure or drainage, No eye pain or drainage, No cough, No SOB, No chest tightness nor chest pain, no palpitations.          Vitals:    06/07/23 1454 06/08/23 0840   BP:  (!) 145/109   BP Location:  Left arm   Patient Position:  Lying   Pulse:  89   Resp:  " 16   Temp:  97.3 °F (36.3 °C)   TempSrc:  Temporal   SpO2:  96%   Weight: 84.4 kg (186 lb)    Height: 170.2 cm (67\")          Objective   Physical Exam  VSS, NNR, NCAT, NMNA, NRD, AAOx3.      -------    Assessment & Plan:  - as noted above, the stated intervention is indicated  - Follow-up plan will be noted in the operative report        Has follow-up on July 20      EMR Dragon/Transcription disclaimer:   Typed items in this encounter note may have been created by electronic transcription/translation software which converts spoken language to printed text. The electronic translation of spoken language may permit erroneous, or at times, nonsensical words or phrases to be inadvertently transcribed; Although I have reviewed the note for such errors, some may still exist.      "

## 2023-07-17 ENCOUNTER — TELEPHONE (OUTPATIENT)
Dept: GASTROENTEROLOGY | Facility: CLINIC | Age: 53
End: 2023-07-17

## 2023-07-17 NOTE — TELEPHONE ENCOUNTER
Caller: Govind Manriquez    Relationship to patient: Self    Best call back number: [phone]     Type of visit: COLONOSCOPY DUE. PT WOULD LIKE TO SEE IF HE'S DUE FOR EGD ALSO    Requested date: ANY     Additional notes:

## 2023-07-27 DIAGNOSIS — M54.9 UPPER BACK PAIN: Primary | ICD-10-CM

## 2023-07-27 DIAGNOSIS — M47.812 CERVICAL FACET SYNDROME: ICD-10-CM

## 2023-08-03 ENCOUNTER — PREP FOR SURGERY (OUTPATIENT)
Dept: SURGERY | Facility: SURGERY CENTER | Age: 53
End: 2023-08-03
Payer: COMMERCIAL

## 2023-08-03 ENCOUNTER — OFFICE VISIT (OUTPATIENT)
Dept: GASTROENTEROLOGY | Facility: CLINIC | Age: 53
End: 2023-08-03
Payer: COMMERCIAL

## 2023-08-03 VITALS
TEMPERATURE: 96.6 F | HEART RATE: 96 BPM | WEIGHT: 185.6 LBS | BODY MASS INDEX: 29.13 KG/M2 | DIASTOLIC BLOOD PRESSURE: 80 MMHG | SYSTOLIC BLOOD PRESSURE: 120 MMHG | OXYGEN SATURATION: 96 % | HEIGHT: 67 IN

## 2023-08-03 DIAGNOSIS — Z86.010 PERSONAL HISTORY OF COLONIC POLYPS: ICD-10-CM

## 2023-08-03 DIAGNOSIS — Z12.11 ENCOUNTER FOR SCREENING FOR MALIGNANT NEOPLASM OF COLON: Primary | ICD-10-CM

## 2023-08-03 PROBLEM — Z86.0100 PERSONAL HISTORY OF COLONIC POLYPS: Status: ACTIVE | Noted: 2023-08-03

## 2023-08-03 RX ORDER — SODIUM CHLORIDE, SODIUM LACTATE, POTASSIUM CHLORIDE, CALCIUM CHLORIDE 600; 310; 30; 20 MG/100ML; MG/100ML; MG/100ML; MG/100ML
30 INJECTION, SOLUTION INTRAVENOUS CONTINUOUS PRN
OUTPATIENT
Start: 2023-08-03

## 2023-08-03 RX ORDER — SODIUM CHLORIDE 0.9 % (FLUSH) 0.9 %
10 SYRINGE (ML) INJECTION AS NEEDED
OUTPATIENT
Start: 2023-08-03

## 2023-08-03 RX ORDER — SODIUM CHLORIDE 0.9 % (FLUSH) 0.9 %
3 SYRINGE (ML) INJECTION EVERY 12 HOURS SCHEDULED
OUTPATIENT
Start: 2023-08-03

## 2023-08-18 DIAGNOSIS — M62.838 MUSCLE SPASM: ICD-10-CM

## 2023-08-18 DIAGNOSIS — G44.86 CERVICOGENIC HEADACHE: ICD-10-CM

## 2023-08-21 RX ORDER — BACLOFEN 10 MG/1
TABLET ORAL
Qty: 270 TABLET | Refills: 0 | Status: SHIPPED | OUTPATIENT
Start: 2023-08-21

## 2023-08-25 ENCOUNTER — HOSPITAL ENCOUNTER (OUTPATIENT)
Dept: MRI IMAGING | Facility: HOSPITAL | Age: 53
Discharge: HOME | End: 2023-08-25
Payer: COMMERCIAL

## 2023-08-25 DIAGNOSIS — M54.9 UPPER BACK PAIN: ICD-10-CM

## 2023-08-25 DIAGNOSIS — M47.812 CERVICAL FACET SYNDROME: ICD-10-CM

## 2023-08-25 PROCEDURE — A9577 INJ MULTIHANCE: HCPCS

## 2023-08-25 PROCEDURE — 0 GADOBENATE DIMEGLUMINE 529 MG/ML SOLUTION

## 2023-08-25 PROCEDURE — 72157 MRI CHEST SPINE W/O & W/DYE: CPT

## 2023-08-25 PROCEDURE — 72156 MRI NECK SPINE W/O & W/DYE: CPT

## 2023-08-25 RX ADMIN — GADOBENATE DIMEGLUMINE 17 ML: 529 INJECTION, SOLUTION INTRAVENOUS at 08:54

## 2023-08-30 NOTE — PROGRESS NOTES
Patient informed and understood feedback. He wants to know how far out does he need to follow up with you for an OV. Please advice.

## 2023-08-30 NOTE — PROGRESS NOTES
Please let patient know that his cervical MRI shows cervical left foraminal narrowing at C3-C4 and mild to moderate foraminal bilaterally at C4-5.  Facet hypertrophy (joint enlargement) is also noted at these levels.  No significant canal stenosis noted in cervical or thoracic MRI. Findings similar to previous MRI. The radiologist notes some denervation atrophy (muscle weakness) of the cervical paraspinal muscles at C4-5 which he believes may be caused from the cervical RFA. Radiologist recommends follow up MRI in 3-6 months.     Dr. Van, please see incidental finding of hyperintense nodule  within the right lobe of the thyroid. Radiologist recommended thyroid sonography for further evaluation.

## 2023-08-31 DIAGNOSIS — E04.1 THYROID NODULE: Primary | ICD-10-CM

## 2023-09-01 NOTE — PROGRESS NOTES
He can follow up as needed for increased pain or to repeat procedures.  He can repeat the cervical RFA in December.

## 2023-09-06 ENCOUNTER — HOSPITAL ENCOUNTER (OUTPATIENT)
Dept: ULTRASOUND IMAGING | Facility: HOSPITAL | Age: 53
Discharge: HOME | End: 2023-09-06
Admitting: INTERNAL MEDICINE
Payer: COMMERCIAL

## 2023-09-06 DIAGNOSIS — E04.1 THYROID NODULE: ICD-10-CM

## 2023-09-06 DIAGNOSIS — E04.1 THYROID NODULE: Primary | ICD-10-CM

## 2023-09-06 PROCEDURE — 76536 US EXAM OF HEAD AND NECK: CPT

## 2023-09-13 ENCOUNTER — OFFICE VISIT (OUTPATIENT)
Dept: INTERNAL MEDICINE | Facility: CLINIC | Age: 53
End: 2023-09-13
Payer: COMMERCIAL

## 2023-09-13 VITALS
WEIGHT: 189.4 LBS | BODY MASS INDEX: 29.73 KG/M2 | OXYGEN SATURATION: 98 % | SYSTOLIC BLOOD PRESSURE: 112 MMHG | TEMPERATURE: 98.5 F | DIASTOLIC BLOOD PRESSURE: 80 MMHG | HEART RATE: 76 BPM | HEIGHT: 67 IN

## 2023-09-13 DIAGNOSIS — M54.12 CERVICAL RADICULOPATHY: ICD-10-CM

## 2023-09-13 DIAGNOSIS — M62.838 MUSCLE SPASM: ICD-10-CM

## 2023-09-13 DIAGNOSIS — Z00.00 HEALTH CARE MAINTENANCE: Primary | ICD-10-CM

## 2023-09-13 DIAGNOSIS — G44.86 CERVICOGENIC HEADACHE: ICD-10-CM

## 2023-09-13 DIAGNOSIS — E78.2 MIXED HYPERLIPIDEMIA: ICD-10-CM

## 2023-09-13 DIAGNOSIS — G43.009 MIGRAINE WITHOUT AURA AND WITHOUT STATUS MIGRAINOSUS, NOT INTRACTABLE: ICD-10-CM

## 2023-09-13 DIAGNOSIS — M47.812 CERVICAL FACET SYNDROME: ICD-10-CM

## 2023-09-13 PROCEDURE — 99396 PREV VISIT EST AGE 40-64: CPT | Performed by: INTERNAL MEDICINE

## 2023-09-13 RX ORDER — BACLOFEN 10 MG/1
10 TABLET ORAL 3 TIMES DAILY PRN
Qty: 60 TABLET | Refills: 1 | Status: SHIPPED | OUTPATIENT
Start: 2023-09-13

## 2023-09-13 RX ORDER — SUMATRIPTAN 100 MG/1
TABLET, FILM COATED ORAL
Qty: 27 TABLET | Refills: 3 | Status: SHIPPED | OUTPATIENT
Start: 2023-09-13

## 2023-09-13 NOTE — PROGRESS NOTES
"Subjective   Govind Manriquez is a 52 y.o. male and is here for a comprehensive physical exam. The patient reports problems - headache .      Do you take any herbs or supplements that were not prescribed by a doctor?       Social History:   Social History     Socioeconomic History    Marital status:      Spouse name: Natasha Manriquez    Number of children: 1   Tobacco Use    Smoking status: Never    Smokeless tobacco: Never   Vaping Use    Vaping Use: Never used   Substance and Sexual Activity    Alcohol use: No    Drug use: No    Sexual activity: Defer       Family History:   Family History   Problem Relation Age of Onset    Heart disease Father 56        started with CAD in 30s non-smoker but obese    Heart failure Father     Stroke Paternal Grandmother     Colon cancer Neg Hx     Colon polyps Neg Hx     Crohn's disease Neg Hx     Irritable bowel syndrome Neg Hx     Ulcerative colitis Neg Hx        Past Medical History:   Past Medical History:   Diagnosis Date    GERD (gastroesophageal reflux disease)     Hyperlipidemia     Migraine     Neck pain     Observed sleep apnea 10/15/2020           Review of Systems    Pertinent items are noted in HPI.    Objective   /80 (BP Location: Left arm, Patient Position: Sitting)   Pulse 76   Temp 98.5 °F (36.9 °C) (Oral)   Ht 170.2 cm (67.01\")   Wt 85.9 kg (189 lb 6.4 oz)   SpO2 98%   BMI 29.66 kg/m²     General Appearance:    Alert, cooperative, no distress, appears stated age   Head:    Normocephalic, without obvious abnormality, atraumatic   Eyes:    PERRL, conjunctiva/corneas clear, EOM's intact, fundi     benign, both eyes        Ears:    Normal TM's and external ear canals, both ears   Nose:   Nares normal, septum midline, mucosa normal, no drainage    or sinus tenderness   Throat:   Lips, mucosa, and tongue normal; teeth and gums normal   Neck:   Supple, symmetrical, trachea midline, no adenopathy;        thyroid:  No enlargement/tenderness/nodules; no " carotid    bruit or JVD   Back:     Symmetric, no curvature, ROM normal, no CVA tenderness   Lungs:     Clear to auscultation bilaterally, respirations unlabored   Chest wall:    No tenderness or deformity   Heart:    Regular rate and rhythm, S1 and S2 normal, no murmur, rub   or gallop   Abdomen:     Soft, non-tender, bowel sounds active all four quadrants,     no masses, no organomegaly           Extremities:   Extremities normal, atraumatic, no cyanosis or edema   Pulses:   2+ and symmetric all extremities   Skin:   Skin color, texture, turgor normal, no rashes or lesions   Lymph nodes:   Cervical, supraclavicular, and axillary nodes normal   Neurologic:   CNII-XII intact. Normal strength, sensation and reflexes       throughout       Vitals:    09/13/23 0826   BP: 112/80   Pulse: 76   Temp: 98.5 °F (36.9 °C)   SpO2: 98%     Body mass index is 29.66 kg/m².      Medications:   Current Outpatient Medications:     baclofen (LIORESAL) 10 MG tablet, TAKE 1 TABLET BY MOUTH THREE TIMES DAILY AS NEEDED FOR MUSCLE SPASMS, Disp: 270 tablet, Rfl: 0    SUMAtriptan (Imitrex) 100 MG tablet, Take one tablet at onset of headache. May repeat dose one time in 2 hours if headache not relieved., Disp: 27 tablet, Rfl: 3    topiramate (TOPAMAX) 25 MG capsule (sprinkle), Take 1 capsule by mouth Daily., Disp: 90 capsule, Rfl: 3    BMI is >= 25 and <30. (Overweight) The following options were offered after discussion;: exercise counseling/recommendations and nutrition counseling/recommendations        Assessment & Plan   Healthy male exam.      1. Healthcare Maintenance:  2. Patient Counseling:  --Nutrition: Stressed importance of moderation in sodium/caffeine intake, saturated fat and cholesterol, caloric balance, sufficient intake of fresh fruits, vegetables, fiber, calcium and vit D  --Exercise: he does exercise  --Substance Abuse: no tob no etoh  --Dental health: he does go to the dentist reg  --Immunizations reviewed.  --Discussed  benefits of screening colonoscopy.  3.  HPL-  declines stain discussed at length  He is controlling all other risk  factor  calcium score and vasc screen neg  4.  HA- It seems to come from the neck  He has done multiple things for this wo relieftake prn use of topamax and baclofen and imitrex

## 2023-09-16 DIAGNOSIS — G44.86 CERVICOGENIC HEADACHE: ICD-10-CM

## 2023-09-16 DIAGNOSIS — M62.838 MUSCLE SPASM: ICD-10-CM

## 2023-09-19 RX ORDER — BACLOFEN 10 MG/1
TABLET ORAL
Qty: 270 TABLET | OUTPATIENT
Start: 2023-09-19

## 2023-10-10 ENCOUNTER — HOSPITAL ENCOUNTER (OUTPATIENT)
Facility: SURGERY CENTER | Age: 53
Setting detail: HOSPITAL OUTPATIENT SURGERY
Discharge: HOME | End: 2023-10-10
Attending: INTERNAL MEDICINE | Admitting: INTERNAL MEDICINE
Payer: COMMERCIAL

## 2023-10-10 ENCOUNTER — ANESTHESIA (OUTPATIENT)
Dept: SURGERY | Facility: SURGERY CENTER | Age: 53
End: 2023-10-10
Payer: COMMERCIAL

## 2023-10-10 ENCOUNTER — ANESTHESIA EVENT (OUTPATIENT)
Dept: SURGERY | Facility: SURGERY CENTER | Age: 53
End: 2023-10-10
Payer: COMMERCIAL

## 2023-10-10 VITALS
HEART RATE: 86 BPM | SYSTOLIC BLOOD PRESSURE: 136 MMHG | OXYGEN SATURATION: 97 % | HEIGHT: 67 IN | TEMPERATURE: 98.4 F | RESPIRATION RATE: 16 BRPM | BODY MASS INDEX: 28.94 KG/M2 | DIASTOLIC BLOOD PRESSURE: 98 MMHG | WEIGHT: 184.4 LBS

## 2023-10-10 DIAGNOSIS — Z12.11 ENCOUNTER FOR SCREENING FOR MALIGNANT NEOPLASM OF COLON: ICD-10-CM

## 2023-10-10 DIAGNOSIS — Z86.010 PERSONAL HISTORY OF COLONIC POLYPS: ICD-10-CM

## 2023-10-10 PROCEDURE — 25810000003 LACTATED RINGERS PER 1000 ML

## 2023-10-10 PROCEDURE — 25010000002 PROPOFOL 10 MG/ML EMULSION: Performed by: ANESTHESIOLOGY

## 2023-10-10 PROCEDURE — 88305 TISSUE EXAM BY PATHOLOGIST: CPT | Performed by: INTERNAL MEDICINE

## 2023-10-10 PROCEDURE — 25810000003 LACTATED RINGERS PER 1000 ML: Performed by: INTERNAL MEDICINE

## 2023-10-10 PROCEDURE — 0 LIDOCAINE 1 % SOLUTION: Performed by: ANESTHESIOLOGY

## 2023-10-10 PROCEDURE — 45385 COLONOSCOPY W/LESION REMOVAL: CPT | Performed by: INTERNAL MEDICINE

## 2023-10-10 RX ORDER — MAGNESIUM HYDROXIDE 1200 MG/15ML
LIQUID ORAL AS NEEDED
Status: DISCONTINUED | OUTPATIENT
Start: 2023-10-10 | End: 2023-10-10 | Stop reason: HOSPADM

## 2023-10-10 RX ORDER — SODIUM CHLORIDE 0.9 % (FLUSH) 0.9 %
10 SYRINGE (ML) INJECTION AS NEEDED
Status: DISCONTINUED | OUTPATIENT
Start: 2023-10-10 | End: 2023-10-10 | Stop reason: HOSPADM

## 2023-10-10 RX ORDER — SODIUM CHLORIDE, SODIUM LACTATE, POTASSIUM CHLORIDE, CALCIUM CHLORIDE 600; 310; 30; 20 MG/100ML; MG/100ML; MG/100ML; MG/100ML
30 INJECTION, SOLUTION INTRAVENOUS CONTINUOUS PRN
Status: DISCONTINUED | OUTPATIENT
Start: 2023-10-10 | End: 2023-10-10 | Stop reason: HOSPADM

## 2023-10-10 RX ORDER — SODIUM CHLORIDE 0.9 % (FLUSH) 0.9 %
3 SYRINGE (ML) INJECTION EVERY 12 HOURS SCHEDULED
Status: DISCONTINUED | OUTPATIENT
Start: 2023-10-10 | End: 2023-10-10 | Stop reason: HOSPADM

## 2023-10-10 RX ORDER — LIDOCAINE HYDROCHLORIDE 10 MG/ML
INJECTION, SOLUTION INFILTRATION; PERINEURAL AS NEEDED
Status: DISCONTINUED | OUTPATIENT
Start: 2023-10-10 | End: 2023-10-10 | Stop reason: SURG

## 2023-10-10 RX ORDER — SODIUM CHLORIDE, SODIUM LACTATE, POTASSIUM CHLORIDE, CALCIUM CHLORIDE 600; 310; 30; 20 MG/100ML; MG/100ML; MG/100ML; MG/100ML
20 INJECTION, SOLUTION INTRAVENOUS CONTINUOUS
Status: DISCONTINUED | OUTPATIENT
Start: 2023-10-10 | End: 2023-10-10 | Stop reason: HOSPADM

## 2023-10-10 RX ORDER — PROPOFOL 10 MG/ML
VIAL (ML) INTRAVENOUS AS NEEDED
Status: DISCONTINUED | OUTPATIENT
Start: 2023-10-10 | End: 2023-10-10 | Stop reason: SURG

## 2023-10-10 RX ADMIN — SODIUM CHLORIDE, SODIUM LACTATE, POTASSIUM CHLORIDE, AND CALCIUM CHLORIDE: .6; .31; .03; .02 INJECTION, SOLUTION INTRAVENOUS at 08:06

## 2023-10-10 RX ADMIN — PROPOFOL 140 MCG/KG/MIN: 10 INJECTION, EMULSION INTRAVENOUS at 08:10

## 2023-10-10 RX ADMIN — SODIUM CHLORIDE, SODIUM LACTATE, POTASSIUM CHLORIDE, AND CALCIUM CHLORIDE 20 ML/HR: .6; .31; .03; .02 INJECTION, SOLUTION INTRAVENOUS at 06:54

## 2023-10-10 RX ADMIN — LIDOCAINE HYDROCHLORIDE 30 MG: 10 INJECTION, SOLUTION INFILTRATION; PERINEURAL at 08:10

## 2023-10-10 RX ADMIN — PROPOFOL 100 MG: 10 INJECTION, EMULSION INTRAVENOUS at 08:10

## 2023-10-10 NOTE — ANESTHESIA POSTPROCEDURE EVALUATION
"Patient: Govind Manriuqez    Procedure Summary       Date: 10/10/23 Room / Location: [facility] / [facility]    Anesthesia Start: 0806 Anesthesia Stop: 0834    Procedure: COLONOSCOPY FOR SCREENING Diagnosis:       Personal history of colonic polyps      Encounter for screening for malignant neoplasm of colon      (Personal history of colonic polyps [Z86.010])      (Encounter for screening for malignant neoplasm of colon [Z12.11])    Surgeons: Iron Gauthier MD Provider: Yimi Agee MD    Anesthesia Type: MAC ASA Status: 2            Anesthesia Type: MAC    Vitals  Vitals Value Taken Time   /89 10/10/23 0840   Temp 36.9 øC (98.4 øF) 10/10/23 0833   Pulse 83 10/10/23 0840   Resp 16 10/10/23 0840   SpO2 91 % 10/10/23 0840           Post Anesthesia Care and Evaluation    Patient location during evaluation: bedside  Patient participation: complete - patient participated  Level of consciousness: awake and alert  Pain management: adequate    Airway patency: patent  Anesthetic complications: No anesthetic complications  PONV Status: controlled  Cardiovascular status: acceptable  Respiratory status: acceptable  Hydration status: acceptable    Comments: /89 (BP Location: Left arm, Patient Position: Lying)   Pulse 83   Temp 36.9 øC (98.4 øF) (Tympanic)   Resp 16   Ht 170.2 cm (67\")   Wt 83.6 kg (184 lb 6.4 oz)   SpO2 91%   BMI 28.88 kg/mý       "

## 2023-10-10 NOTE — H&P
No chief complaint on file.      HPI  Patient today for screening colonoscopy.  He has a history of multiple adenomatous polyps.         Problem List:    Patient Active Problem List   Diagnosis    Hyperlipidemia    Cervical radiculopathy    Neck pain    Migraine without aura, not intractable    Tension headache    Chronic bilateral thoracic back pain    Chronic bilateral low back pain without sciatica    Snoring    Hypersomnia    Cervical facet syndrome    Personal history of colonic polyps    Encounter for screening for malignant neoplasm of colon       Medical History:    Past Medical History:   Diagnosis Date    GERD (gastroesophageal reflux disease)     Hyperlipidemia     Migraine     Neck pain         Social History:    Social History     Socioeconomic History    Marital status:      Spouse name: Natasha Manriquez    Number of children: 1   Tobacco Use    Smoking status: Never    Smokeless tobacco: Never   Vaping Use    Vaping Use: Never used   Substance and Sexual Activity    Alcohol use: No    Drug use: No    Sexual activity: Defer       Family History:   Family History   Problem Relation Age of Onset    Heart disease Father 56        started with CAD in 30s non-smoker but obese    Heart failure Father     Stroke Paternal Grandmother     Colon cancer Neg Hx     Colon polyps Neg Hx     Crohn's disease Neg Hx     Irritable bowel syndrome Neg Hx     Ulcerative colitis Neg Hx        Surgical History:   Past Surgical History:   Procedure Laterality Date    KNEE CARTILAGE SURGERY Left     MEDIAL BRANCH BLOCK Bilateral 12/2/2022    Procedure: CERVICAL MEDIAL BRANCH BLOCK - likely C4 C5 C6 bilateral - x2, 2-3 weeks apart;  Surgeon: Brendan Glez MD;  Location: [facility] [place] MAIN OR;  Service: Pain Management;  Laterality: Bilateral;    MEDIAL BRANCH BLOCK Bilateral 4/13/2023    Procedure: CERVICAL MEDIAL BRANCH BLOCK BILATERAL C4-C5 97103, 83751;  Surgeon: Brendan Glez MD;  Location: [facility] [place] MAIN OR;  Service:  Pain Management;  Laterality: Bilateral;    RADIOFREQUENCY ABLATION Bilateral 6/8/2023    Procedure: BILATERAL C4-C6 RADIOFREQUENCY ABLATION CERVICAL CPT: 75642, 42771;  Surgeon: Brendan Glez MD;  Location: [facility] [place] MAIN OR;  Service: Pain Management;  Laterality: Bilateral;         Current Facility-Administered Medications:     lactated ringers infusion, 30 mL/hr, Intravenous, Continuous PRN, Lockwood, Monica, APRN    sodium chloride 0.9 % flush 10 mL, 10 mL, Intravenous, PRN, Lockwood, Monica, APRN    sodium chloride 0.9 % flush 3 mL, 3 mL, Intravenous, Q12H, Lockwood, Monica, APRN    Allergies:   Allergies   Allergen Reactions    Zocor [Simvastatin] Anaphylaxis     fatigue        The following portions of the patient's history were reviewed by me and updated as appropriate: review of systems, allergies, current medications, past family history, past medical history, past social history, past surgical history and problem list.    Vitals:    10/10/23 0647   BP: 123/89   Pulse: 88   Resp: 16   Temp: 98 øF (36.7 øC)   SpO2: 95%       PHYSICAL EXAM:    CONSTITUTIONAL:  today's vital signs reviewed by me  GASTROINTESTINAL: abdomen is soft nontender nondistended with normal active bowel sounds, no masses are appreciated    Assessment/ Plan  We will proceed with colonoscopy.    Risks and benefits as well as alternatives to endoscopic evaluation were explained to the patient and they voiced understanding and wish to proceed.  These risks include but are not limited to the risk of bleeding, perforation, adverse reaction to sedation, and missed lesions.  The patient was given the opportunity to ask questions prior to the endoscopic procedure.

## 2023-10-10 NOTE — ANESTHESIA PREPROCEDURE EVALUATION
Anesthesia Evaluation     Patient summary reviewed and Nursing notes reviewed   NPO Solid Status: > 8 hours  NPO Liquid Status: > 2 hours           Airway   Mallampati: II  TM distance: >3 FB  Neck ROM: full  No difficulty expected  Dental - normal exam     Pulmonary - negative pulmonary ROS and normal exam   Cardiovascular - normal exam    (+) hyperlipidemia      Neuro/Psych  (+) headaches  GI/Hepatic/Renal/Endo    (+) GERD    Musculoskeletal (-) negative ROS    Abdominal    Substance History - negative use     OB/GYN          Other                    Anesthesia Plan    ASA 2     MAC     intravenous induction     Anesthetic plan, risks, benefits, and alternatives have been provided, discussed and informed consent has been obtained with: patient.    CODE STATUS:

## 2023-10-11 LAB
LAB AP CASE REPORT: NORMAL
LAB AP CLINICAL INFORMATION: NORMAL
PATH REPORT.FINAL DX SPEC: NORMAL
PATH REPORT.GROSS SPEC: NORMAL

## 2023-10-27 ENCOUNTER — TELEPHONE (OUTPATIENT)
Dept: PAIN MEDICINE | Facility: CLINIC | Age: 53
End: 2023-10-27

## 2023-10-27 NOTE — TELEPHONE ENCOUNTER
Caller: Govind Manriquez    Relationship to patient: Self    Best call back number:     Chief complaint: NECK PAIN    Type of visit: RADIOFREQUENCY ABLATION    Requested date: AFTER 12/2/23

## 2023-10-31 ENCOUNTER — TELEPHONE (OUTPATIENT)
Dept: PAIN MEDICINE | Facility: CLINIC | Age: 53
End: 2023-10-31
Payer: COMMERCIAL

## 2023-10-31 DIAGNOSIS — G44.86 CERVICOGENIC HEADACHE: ICD-10-CM

## 2023-10-31 DIAGNOSIS — M62.838 MUSCLE SPASM: ICD-10-CM

## 2023-10-31 RX ORDER — BACLOFEN 10 MG/1
10 TABLET ORAL 3 TIMES DAILY PRN
Qty: 270 TABLET | Refills: 0 | Status: SHIPPED | OUTPATIENT
Start: 2023-10-31 | End: 2023-11-02

## 2023-11-02 ENCOUNTER — PREP FOR SURGERY (OUTPATIENT)
Dept: SURGERY | Facility: SURGERY CENTER | Age: 53
End: 2023-11-02
Payer: COMMERCIAL

## 2023-11-02 ENCOUNTER — TELEPHONE (OUTPATIENT)
Dept: PAIN MEDICINE | Facility: CLINIC | Age: 53
End: 2023-11-02

## 2023-11-02 ENCOUNTER — OFFICE VISIT (OUTPATIENT)
Dept: PAIN MEDICINE | Facility: CLINIC | Age: 53
End: 2023-11-02
Payer: COMMERCIAL

## 2023-11-02 VITALS
SYSTOLIC BLOOD PRESSURE: 140 MMHG | HEIGHT: 67 IN | RESPIRATION RATE: 12 BRPM | WEIGHT: 186.6 LBS | TEMPERATURE: 98.1 F | OXYGEN SATURATION: 95 % | DIASTOLIC BLOOD PRESSURE: 82 MMHG | BODY MASS INDEX: 29.29 KG/M2 | HEART RATE: 87 BPM

## 2023-11-02 DIAGNOSIS — G43.009 MIGRAINE WITHOUT AURA AND WITHOUT STATUS MIGRAINOSUS, NOT INTRACTABLE: ICD-10-CM

## 2023-11-02 DIAGNOSIS — G44.86 CERVICOGENIC HEADACHE: ICD-10-CM

## 2023-11-02 DIAGNOSIS — M54.9 UPPER BACK PAIN: ICD-10-CM

## 2023-11-02 DIAGNOSIS — M47.812 CERVICAL FACET SYNDROME: ICD-10-CM

## 2023-11-02 DIAGNOSIS — M62.838 MUSCLE SPASM: Primary | ICD-10-CM

## 2023-11-02 DIAGNOSIS — M47.812 CERVICAL FACET SYNDROME: Primary | ICD-10-CM

## 2023-11-02 DIAGNOSIS — M54.12 CERVICAL RADICULOPATHY: ICD-10-CM

## 2023-11-02 PROCEDURE — 99214 OFFICE O/P EST MOD 30 MIN: CPT

## 2023-11-02 RX ORDER — CYCLOBENZAPRINE HCL 10 MG
10 TABLET ORAL 2 TIMES DAILY PRN
Qty: 60 TABLET | Refills: 0 | Status: SHIPPED | OUTPATIENT
Start: 2023-11-02

## 2023-11-02 RX ORDER — SODIUM CHLORIDE 0.9 % (FLUSH) 0.9 %
10 SYRINGE (ML) INJECTION AS NEEDED
OUTPATIENT
Start: 2023-11-02

## 2023-11-02 RX ORDER — SODIUM CHLORIDE 0.9 % (FLUSH) 0.9 %
10 SYRINGE (ML) INJECTION EVERY 12 HOURS SCHEDULED
OUTPATIENT
Start: 2023-11-02

## 2023-11-02 NOTE — TELEPHONE ENCOUNTER
On patient's last procedure Dr Glez did C 4,5,6 and was considering doing C 3. He is wondering if this will be an option on his next procedure. He said you can respond to him on mychart  if you prefer.

## 2023-11-02 NOTE — PROGRESS NOTES
CHIEF COMPLAINT  Cervical pain    Subjective   Govind Manriquez is a 52 y.o. male  who presents for follow-up.  He has a history chronic neck pain. He reports that his pain has remained consistent since his last office visit. He would like to discuss repeating a cervical RFA. He reports last RFA helped with cervical tension.    Today pain is 3/10VAS in severity. Pain on both sides of his neck (Right > Left) and radiates into occipital region. Describes this pain as an intermittent ache.  He continues to report intermittent headaches which he manages with Imitrex. Pain is worsened by prolonged position, especially when sleeping and bending/twisting. Pain improves with rest/repositioning, heat/ice, Tylenol or Ibuprofen as needed, Baclofen as needed for muscle spasm and procedures.  He has started PT/chiropractic therapy since his last office visit.    History of C6-C7 KIM x 2 with no relief. He has also had Occipital Nerve Blocks x 2 with SITA Calvillo with no relief. He utilizes Sumatriptan and Topamax as needed for headaches.     He is scheduled for cataract surgery to his right eye on 11/6/23.    Procedures:  6/8/23 - Bilateral C4-C6 RFA - 50% relief x 3-4 months  4/13/23 - Bilateral C4-C6 Cervical MBB - 80% relief x 1 day  12/2/22 - Right C4-C6 Cervical MBB - 80% relief x 3 days    Neck Pain   This is a chronic problem. The current episode started more than 1 year ago. The problem occurs intermittently. The problem has been waxing and waning. The pain is associated with an unknown factor. The pain is present in the right side, occipital region, left side and midline. The quality of the pain is described as aching (intermittent sharp pains down right side). The pain is at a severity of 3/10. The symptoms are aggravated by position, twisting and bending. Associated symptoms include headaches. Pertinent negatives include no chest pain, fever, numbness or weakness. He has tried chiropractic manipulation, ice,  heat, muscle relaxants, NSAIDs and acetaminophen for the symptoms. The treatment provided mild relief.      PEG Assessment   What number best describes your pain on average in the past week?8  What number best describes how, during the past week, pain has interfered with your enjoyment of life?10  What number best describes how, during the past week, pain has interfered with your general activity?  8    Review of Pertinent Medical Data ---  MRI OF THE CERVICAL AND THORACIC SPINE WITH AND WITHOUT CONTRAST     CLINICAL HISTORY: Worsening chronic neck pain and mid back pain.  Headaches.     TECHNIQUE: MRI of the cervical spine was obtained with sagittal pre and  postgadolinium T1, gradient echo, and T2 weighted images. Additionally,  there are axial pre and postgadolinium T1, gradient echo, and  T2-weighted images through the cervical spine. Additionally, an MRI of  the thoracic spine was obtained with sagittal pre and postgadolinium T1,  proton density, and T2 weighted images through the thoracic spine. Axial  pre and post gadolinium T1 and axial T2 weighted images through the  thoracic spine were additionally obtained.     COMPARISON: MRI of the cervical and thoracic spine dated 05/11/2021.     FINDINGS:     CERVICAL SPINE: At C2-C3, there is no significant canal or foraminal  stenosis.     At C3-C4, there is severe right and moderate-to-severe left foraminal  narrowing secondary to uncovertebral joint hypertrophy and facet  arthrosis. Similar findings were noted on the prior exam. No significant  canal stenosis is noted.     At C4-C5, there is moderate left foraminal narrowing secondary to  uncovertebral joint hypertrophy and facet arthrosis. There is  mild-to-moderate right foraminal narrowing secondary to uncovertebral  joint hypertrophy. Again, similar findings are noted on the prior exam.  No significant canal stenosis is identified.     At C5-C6, C6-C7, and C7-T1, there is no significant degree of canal  or  foraminal narrowing.     The cervical spinal cord has normal signal intensity. There are no  abnormal foci of susceptibility artifact.     In the interval since the previous MRI study, the patient has developed  edema and contrast enhancement within posterior paraspinal muscles that  extends from C4 down to the upper thoracic spine, but most prominently  down to the approximate T3 level. The findings are more prominent on the  left side as compared to the right and most prominently involves the  multifidus and semispinalis musculature. The patient has undergone a  recent bilateral C4 to C6 radiofrequency ablation on 06/08/2023. These  findings are of uncertain significance. However, I suspect that the  findings are due to a denervation atrophy. Follow-up cervical spine MR  imaging with and without the use of IV contrast could be useful to  further assess this finding.     THORACIC SPINE: The edema and contrast enhancement within the posterior  paraspinal musculature within the upper thoracic spine is again  appreciated and this extends down to the T3 level.     Otherwise, there is no significant degree of canal or foraminal stenosis  seen throughout the thoracic spine. The thoracic spinal cord has normal  signal intensity. There is maintenance of vertebral body height. Small  hemangiomas are again appreciated within the T10 vertebral body.     Incidental note is made of a 1.9 cm indeterminate T2 hyperintense nodule  within the right lobe of the thyroid. Further evaluation with thyroid  sonography is suggested.     IMPRESSION:     There is severe right and moderate-to-severe left C3-C4 foraminal  stenosis secondary to uncovertebral joint hypertrophy and facet  arthrosis. Also, there is moderate left C4-C5 and mild-to-moderate right  C4-C5 foraminal narrowing secondary to uncovertebral joint hypertrophy  and facet arthrosis on the left side and primarily uncovertebral joint  hypertrophy on the right side. Similar  findings were noted on the prior  study.     There is no significant degree of canal stenosis seen throughout the  cervical spine. No significant canal or foraminal stenosis is seen  throughout the thoracic spine.     In the interval since the prior MRI examination, the patient has  developed edema and contrast enhancement within the posterior paraspinal  musculature that extends from C4 down to the upper thoracic spine down  to the approximate T3 level. These findings are more prominent on the  left side as compared to the right and primarily involve the multifidus  and semispinalis musculature. The patient has undergone a recent  bilateral C4 to C6 radiofrequency ablation on 06/08/2023. The findings  are of uncertain significance. However, I suspect that the findings are  due to a subacute denervation atrophy. A follow-up MRI of the cervical  spine with and without the use of IV contrast could be useful to further  assess this finding. A reasonable interval for the next exam would be  approximately 3 to 6 months.     Incidental note is made of a 1.9 cm indeterminate T2 hyperintense nodule  within the right lobe of the thyroid. Further characterization with  thyroid sonography is suggested.     This report was finalized on 8/25/2023 4:25 PM by Dr. Az Frausto M.D.     The following portions of the patient's history were reviewed and updated as appropriate: allergies, current medications, past family history, past medical history, past social history, past surgical history, and problem list.    Review of Systems   Constitutional:  Negative for activity change, fatigue and fever.   HENT:  Negative for congestion.    Respiratory:  Negative for cough and chest tightness.    Cardiovascular:  Negative for chest pain.   Gastrointestinal:  Negative for abdominal pain, constipation and diarrhea.   Genitourinary:  Negative for difficulty urinating and dysuria.   Musculoskeletal:  Positive for neck pain.   Neurological:   "Positive for headaches. Negative for dizziness, weakness, light-headedness and numbness.   Psychiatric/Behavioral:  Negative for agitation, sleep disturbance and suicidal ideas. The patient is not nervous/anxious.      I have reviewed and confirmed the accuracy of the ROS as documented by the MA/LPN/RN SITA Neves    Vitals:    11/02/23 0802   BP: 140/82   BP Location: Right arm   Patient Position: Sitting   Cuff Size: Adult   Pulse: 87   Resp: 12   Temp: 98.1 °F (36.7 °C)   TempSrc: Temporal   SpO2: 95%   Weight: 84.6 kg (186 lb 9.6 oz)   Height: 170.2 cm (67\")   PainSc:   3     Objective   Physical Exam  Constitutional:       Appearance: Normal appearance.   HENT:      Head: Normocephalic.   Cardiovascular:      Rate and Rhythm: Normal rate and regular rhythm.   Pulmonary:      Effort: Pulmonary effort is normal.      Breath sounds: Normal breath sounds.   Musculoskeletal:      Cervical back: Spasms and tenderness present. Spinous process tenderness and muscular tenderness present. Decreased range of motion.      Comments: + cervical flexion/extension compression test   Skin:     General: Skin is warm and dry.      Capillary Refill: Capillary refill takes less than 2 seconds.   Neurological:      General: No focal deficit present.      Mental Status: He is alert and oriented to person, place, and time.   Psychiatric:         Mood and Affect: Mood normal.         Behavior: Behavior normal.         Thought Content: Thought content normal.         Cognition and Memory: Cognition normal.       Assessment & Plan   Diagnoses and all orders for this visit:    1. Muscle spasm (Primary)    2. Cervicogenic headache    3. Upper back pain    4. Cervical facet syndrome    5. Cervical radiculopathy    6. Migraine without aura and without status migrainosus, not intractable    --- Repeat C3-C5 RFA   -------  Education about Medial Branch Blockade and RF Therapy:  This medial branch blockade (MBB) suggested is intended " "for diagnostic purposes, with the intent of offering the patient Radiofrequency thermal rhizotomy (RF) if the MBB is diagnostically effective.  The diagnostic blockade is necessary to determine the likelihood that RF therapy could be efficacious in providing long term relief to the patient.    Medial branches are sensory nerve branches that connect to a facet joint and transmit sensations & pain signals from that joint.  Facet is a term for the type of joints found in the spine.  Medial branches are the nerves that go to a facet, and therefore are also sometimes called \"facet joint nerves\" (FJNs).      In a medial branch blockade procedure, xray fluoroscopy is used to verify the locations of the outside of the joint lines which are being targeted.  Under xray guidance, needles are placed to these areas.  Contrast dye is injected to confirm proper placement, with dye flowing over the joint area, and to ensure that the dye does not flow into unintended areas such as a vein.  When this is confirmed, local anesthetic is injected to block the medial branch at that joint level.      If MBBs are diagnostically successful in blocking pain, then the patient is most likely a great candidate for Radiofrequency of those facet joint nerves.  In the RF procedure, needles are placed to the joint lines in the same fashion, and after testing, the needle tips are heated to thermally treat the nerves, blocking the nerves by in essence damaging the nerves with the heat treatment(non-pulsed).       Medically, a successful RF procedure should provide a patient with 50% pain relief or more for at least 6 months.  Clinical experience suggests that successful patients receive relief more in the range of 12 months on average.  We also discussed that a fortunate minority of patients receive therapeutic success from the MBB, and may not require RF ablation.  If a patient receives more than 8 weeks of relief from MBB, then occasional repeat MBB " for therapeutic purposes is a very reasonable alternative therapy.  This course of therapy is consistent with our LCDs according to our CMS  in the area, and therefore other insurance providers should follow accordingly.  We will monitor our patients to screen for these therapeutic responders and will offer RF therapy only when necessary.      We discussed that MBB & RF are not without risks.  Guidelines regarding anticoagulant use & neuraxial procedures will be respected.  Patients that are ill or otherwise may be at risk for sepsis will not have their spines accessed by neuraxial injections of any type.  This patient will not be offered these therapies if there is an increased risk.   We discussed that there is a risk of postprocedural pain and also a risk of worsening of clinical picture with these procedures as with any neuraxial procedure.    -------  Discussed with the patient that sedation is optional for this procedure.  The sedation offered is called conscious sedation which is different from general anesthesia that is utilized in surgical procedures. The dosing of the sedation is determined by the physician and they will be monitored throughout the procedure. With conscious sedation it is possible to remember parts or all of the procedure, this is normal. They will need to have a  with them as driving is prohibited following conscious sedation.      NPO instructions for conscious sedation:  --- Do not eat 6 hours prior to the procedure.   --- Do not drink any dairy or citrus 4 hours prior to the procedure.   --- Do not drink anything, including clear liquids, 2 hours prior to procedure.      If the NPO instructions are not followed then the procedure may be performed without sedation or the procedure will need to be rescheduled.     --- D/C Baclofen.  --- Trial Flexeril 10mg BID PRN for muscle spasms. Discussed medication with the patient.  Included in this discussion was the potential  for side effects and adverse events.  Patient verbalized understanding and wished to proceed.  Prescription will be sent to pharmacy.  --- Follow-up for procedure    Govind Manriquez reports a pain score of 3.  Given his pain assessment as noted, treatment options were discussed and the following options were decided upon as a follow-up plan to address the patient's pain: continuation of current treatment plan for pain and repeat Bilateral C3-C5 Cervical RFA.     Thermal Radiofrequency Destruction  This repeat thermal radiofrequency destruction (RFA) of cervical, thoracic, or lumbar paravertebral facet joint (medial branch) nerves at the same anatomical site is considered medically reasonable and necessary as this patient has met the criteria of having a minimum of consistent 50% improvement in pain for at least six (6) months or at least 50% consistent improvement in the ability to perform previously painful movements and ADLs as compared to baseline measurement using the same scale.    ANTELMO REPORT  As the clinician, I personally reviewed the ANTELMO from 11/2/23 while the patient was in the office today.    Dictated utilizing Dragon dictation.

## 2023-11-29 DIAGNOSIS — M62.838 MUSCLE SPASM: ICD-10-CM

## 2023-11-30 RX ORDER — CYCLOBENZAPRINE HCL 10 MG
10 TABLET ORAL 2 TIMES DAILY PRN
Qty: 60 TABLET | Refills: 1 | Status: SHIPPED | OUTPATIENT
Start: 2023-11-30

## 2023-12-20 ENCOUNTER — TRANSCRIBE ORDERS (OUTPATIENT)
Dept: SURGERY | Facility: SURGERY CENTER | Age: 53
End: 2023-12-20
Payer: COMMERCIAL

## 2023-12-20 DIAGNOSIS — Z41.9 SURGERY, ELECTIVE: Primary | ICD-10-CM

## 2024-01-29 RX ORDER — TOPIRAMATE 25 MG/1
25 CAPSULE, COATED PELLETS ORAL DAILY
Qty: 90 CAPSULE | Refills: 3 | Status: SHIPPED | OUTPATIENT
Start: 2024-01-29

## 2024-03-18 RX ORDER — SUMATRIPTAN 100 MG/1
TABLET, FILM COATED ORAL
Qty: 27 TABLET | Refills: 1 | Status: SHIPPED | OUTPATIENT
Start: 2024-03-18

## 2024-06-23 DIAGNOSIS — M62.838 MUSCLE SPASM: ICD-10-CM

## 2024-06-24 RX ORDER — CYCLOBENZAPRINE HCL 10 MG
10 TABLET ORAL 2 TIMES DAILY PRN
Qty: 60 TABLET | Refills: 1 | OUTPATIENT
Start: 2024-06-24

## 2024-06-24 NOTE — TELEPHONE ENCOUNTER
Patient needs an office visit. I have not seen him since this medication was prescribed. He may also check with his PCP to see if they feel comfortable prescribing.

## 2024-07-25 NOTE — PROGRESS NOTES
Subjective   Govind Manriquez is a 46 y.o. male here today to f/u on hyperlipidemia.      History of Present Illness   Pt has been taking cholesterol meds as prescribed.  No difficulties with myalgias.   He is cont to have HA    The following portions of the patient's history were reviewed and updated as appropriate: allergies, current medications, past medical history, past social history and problem list.  HE is exercising occas  He does eat a relatively healthy diet    Review of Systems   All other systems reviewed and are negative.      Objective   Physical Exam   Constitutional: He is oriented to person, place, and time. He appears well-developed and well-nourished.   HENT:   Head: Normocephalic and atraumatic.   Right Ear: External ear normal.   Left Ear: External ear normal.   Mouth/Throat: Oropharynx is clear and moist.   Eyes: Conjunctivae and EOM are normal. Pupils are equal, round, and reactive to light.   Neck: Normal range of motion. No tracheal deviation present. No thyromegaly present.   Cardiovascular: Normal rate, regular rhythm, normal heart sounds and intact distal pulses.    Pulmonary/Chest: Effort normal and breath sounds normal.   Abdominal: Soft. Bowel sounds are normal. He exhibits no distension. There is no tenderness.   Musculoskeletal: Normal range of motion. He exhibits no edema or deformity.   Neurological: He is alert and oriented to person, place, and time.   Skin: Skin is warm and dry.   Psychiatric: He has a normal mood and affect. His behavior is normal. Judgment and thought content normal.   Vitals reviewed.      Vitals:    11/08/17 0919   BP: 112/72   Pulse: 75   SpO2: 96%     Orders Only on 10/20/2017   Component Date Value Ref Range Status   • Glucose 11/02/2017 102* 65 - 99 mg/dL Final   • BUN 11/02/2017 14  6 - 20 mg/dL Final   • Creatinine 11/02/2017 1.04  0.76 - 1.27 mg/dL Final   • eGFR Non  Am 11/02/2017 77  >60 mL/min/1.73 Final   • eGFR African Am 11/02/2017 93   Submitted PA for Zepbound  [facility]ia [facility] Key: [id number]    STATUS: Approvedtoday  CaseId:[id number];Status:Approved;Review Type:Prior Auth;Coverage Start Date:07/01/2024;Coverage End Date:03/22/2025    If this requires a response please respond to the pool ( P [facility] MEDICATION PRE-AUTH).      Thank you please advise patient.       >60 mL/min/1.73 Final   • BUN/Creatinine Ratio 11/02/2017 13.5  7.0 - 25.0 Final   • Sodium 11/02/2017 141  136 - 145 mmol/L Final   • Potassium 11/02/2017 4.5  3.5 - 5.2 mmol/L Final   • Chloride 11/02/2017 101  98 - 107 mmol/L Final   • Total CO2 11/02/2017 26.6  22.0 - 29.0 mmol/L Final   • Calcium 11/02/2017 10.0  8.6 - 10.5 mg/dL Final   • Total Protein 11/02/2017 7.4  6.0 - 8.5 g/dL Final   • Albumin 11/02/2017 4.50  3.50 - 5.20 g/dL Final   • Globulin 11/02/2017 2.9  gm/dL Final   • A/G Ratio 11/02/2017 1.6  g/dL Final   • Total Bilirubin 11/02/2017 0.5  0.1 - 1.2 mg/dL Final   • Alkaline Phosphatase 11/02/2017 63  39 - 117 U/L Final   • AST (SGOT) 11/02/2017 30  1 - 40 U/L Final   • ALT (SGPT) 11/02/2017 27  1 - 41 U/L Final   • Total Cholesterol 11/02/2017 245* 0 - 200 mg/dL Final   • Triglycerides 11/02/2017 105  0 - 150 mg/dL Final   • HDL Cholesterol 11/02/2017 61* 40 - 60 mg/dL Final   • VLDL Cholesterol 11/02/2017 21  5 - 40 mg/dL Final   • LDL Cholesterol  11/02/2017 163* 0 - 100 mg/dL Final   • LDL/HDL Ratio 11/02/2017 2.67   Final        Current Outpatient Prescriptions:   •  ketoconazole (NIZORAL) 2 % shampoo, Apply 1 application topically 2 (Two) Times a Week., Disp: 120 mL, Rfl: 1  •  rosuvastatin (CRESTOR) 20 MG tablet, Take 1 tablet by mouth Daily., Disp: 90 tablet, Rfl: 3  •  SUMAtriptan-naproxen (TREXIMET)  MG per tablet, Take 1 tablet by mouth Every 2 (Two) Hours As Needed for migraine., Disp: , Rfl:       Assessment/Plan   Diagnoses and all orders for this visit:    Hyperlipidemia, unspecified hyperlipidemia type    Intractable migraine without aura and without status migrainosus  -     Ambulatory Referral to Physical Therapy Evaluate and treat    Neck pain      1. NEck pain with HA-  I am referring to PT and we will try a NSAIDs gel  HE will cont the treximet and he will following up neuro  2. HPL-ok with crestor

## 2024-09-09 DIAGNOSIS — E78.2 MIXED HYPERLIPIDEMIA: ICD-10-CM

## 2024-09-09 DIAGNOSIS — G43.009 MIGRAINE WITHOUT AURA AND WITHOUT STATUS MIGRAINOSUS, NOT INTRACTABLE: ICD-10-CM

## 2024-09-09 DIAGNOSIS — Z00.00 HEALTH CARE MAINTENANCE: ICD-10-CM

## 2024-09-11 LAB
ALBUMIN SERPL-MCNC: 4.7 G/DL (ref 3.5–5.2)
ALBUMIN/GLOB SERPL: 1.8 G/DL
ALP SERPL-CCNC: 73 U/L (ref 39–117)
ALT SERPL-CCNC: 27 U/L (ref 1–41)
AST SERPL-CCNC: 21 U/L (ref 1–40)
BASOPHILS # BLD AUTO: 0.1 10*3/MM3 (ref 0–0.2)
BASOPHILS NFR BLD AUTO: 1.4 % (ref 0–1.5)
BILIRUB SERPL-MCNC: 0.4 MG/DL (ref 0–1.2)
BUN SERPL-MCNC: 16 MG/DL (ref 6–20)
BUN/CREAT SERPL: 15.7 (ref 7–25)
CALCIUM SERPL-MCNC: 9.9 MG/DL (ref 8.6–10.5)
CHLORIDE SERPL-SCNC: 102 MMOL/L (ref 98–107)
CHOLEST SERPL-MCNC: 380 MG/DL (ref 0–200)
CO2 SERPL-SCNC: 27.4 MMOL/L (ref 22–29)
CREAT SERPL-MCNC: 1.02 MG/DL (ref 0.76–1.27)
EGFRCR SERPLBLD CKD-EPI 2021: 87.9 ML/MIN/1.73
EOSINOPHIL # BLD AUTO: 0.27 10*3/MM3 (ref 0–0.4)
EOSINOPHIL NFR BLD AUTO: 3.7 % (ref 0.3–6.2)
ERYTHROCYTE [DISTWIDTH] IN BLOOD BY AUTOMATED COUNT: 13.7 % (ref 12.3–15.4)
GLOBULIN SER CALC-MCNC: 2.6 GM/DL
GLUCOSE SERPL-MCNC: 105 MG/DL (ref 65–99)
HBA1C MFR BLD: 6.1 % (ref 4.8–5.6)
HCT VFR BLD AUTO: 45.7 % (ref 37.5–51)
HDLC SERPL-MCNC: 47 MG/DL (ref 40–60)
HGB BLD-MCNC: 15.5 G/DL (ref 13–17.7)
IMM GRANULOCYTES # BLD AUTO: 0.02 10*3/MM3 (ref 0–0.05)
IMM GRANULOCYTES NFR BLD AUTO: 0.3 % (ref 0–0.5)
LDLC SERPL CALC-MCNC: 298 MG/DL (ref 0–100)
LDLC/HDLC SERPL: 6.36 {RATIO}
LYMPHOCYTES # BLD AUTO: 2.55 10*3/MM3 (ref 0.7–3.1)
LYMPHOCYTES NFR BLD AUTO: 34.8 % (ref 19.6–45.3)
MCH RBC QN AUTO: 30.8 PG (ref 26.6–33)
MCHC RBC AUTO-ENTMCNC: 33.9 G/DL (ref 31.5–35.7)
MCV RBC AUTO: 90.9 FL (ref 79–97)
MONOCYTES # BLD AUTO: 0.67 10*3/MM3 (ref 0.1–0.9)
MONOCYTES NFR BLD AUTO: 9.2 % (ref 5–12)
NEUTROPHILS # BLD AUTO: 3.71 10*3/MM3 (ref 1.7–7)
NEUTROPHILS NFR BLD AUTO: 50.6 % (ref 42.7–76)
NRBC BLD AUTO-RTO: 0 /100 WBC (ref 0–0.2)
PLATELET # BLD AUTO: 263 10*3/MM3 (ref 140–450)
POTASSIUM SERPL-SCNC: 5.1 MMOL/L (ref 3.5–5.2)
PROT SERPL-MCNC: 7.3 G/DL (ref 6–8.5)
RBC # BLD AUTO: 5.03 10*6/MM3 (ref 4.14–5.8)
SODIUM SERPL-SCNC: 140 MMOL/L (ref 136–145)
TRIGL SERPL-MCNC: 170 MG/DL (ref 0–150)
TSH SERPL DL<=0.005 MIU/L-ACNC: 1.34 UIU/ML (ref 0.27–4.2)
VLDLC SERPL CALC-MCNC: 35 MG/DL (ref 5–40)
WBC # BLD AUTO: 7.32 10*3/MM3 (ref 3.4–10.8)

## 2024-09-18 ENCOUNTER — OFFICE VISIT (OUTPATIENT)
Dept: INTERNAL MEDICINE | Facility: CLINIC | Age: 54
End: 2024-09-18
Payer: COMMERCIAL

## 2024-09-18 VITALS
HEART RATE: 75 BPM | HEIGHT: 69 IN | SYSTOLIC BLOOD PRESSURE: 110 MMHG | TEMPERATURE: 98.6 F | WEIGHT: 191.5 LBS | OXYGEN SATURATION: 98 % | DIASTOLIC BLOOD PRESSURE: 86 MMHG | BODY MASS INDEX: 28.36 KG/M2

## 2024-09-18 DIAGNOSIS — Z00.00 HEALTH CARE MAINTENANCE: Primary | ICD-10-CM

## 2024-09-18 DIAGNOSIS — G43.009 MIGRAINE WITHOUT AURA AND WITHOUT STATUS MIGRAINOSUS, NOT INTRACTABLE: ICD-10-CM

## 2024-09-18 PROCEDURE — 99396 PREV VISIT EST AGE 40-64: CPT | Performed by: INTERNAL MEDICINE

## 2024-09-18 RX ORDER — AMOXICILLIN 500 MG/1
1000 CAPSULE ORAL 3 TIMES DAILY
COMMUNITY
Start: 2024-09-13

## 2024-09-18 RX ORDER — SUMATRIPTAN 100 MG/1
TABLET, FILM COATED ORAL
Qty: 27 TABLET | Refills: 1 | Status: SHIPPED | OUTPATIENT
Start: 2024-09-18

## 2024-09-19 LAB
25(OH)D3+25(OH)D2 SERPL-MCNC: 39.4 NG/ML (ref 30–100)
FOLATE SERPL-MCNC: >20 NG/ML (ref 4.78–24.2)
PSA SERPL-MCNC: 1.13 NG/ML (ref 0–4)
VIT B12 SERPL-MCNC: 351 PG/ML (ref 211–946)

## 2025-04-18 DIAGNOSIS — M62.838 MUSCLE SPASM: ICD-10-CM

## 2025-04-18 RX ORDER — CYCLOBENZAPRINE HCL 10 MG
10 TABLET ORAL 2 TIMES DAILY PRN
Qty: 60 TABLET | Refills: 1 | OUTPATIENT
Start: 2025-04-18

## 2025-04-18 NOTE — TELEPHONE ENCOUNTER
Patient has not been seen since 2023. He should discuss additional refills of this medication with his PCP.

## 2025-04-21 RX ORDER — TOPIRAMATE SPINKLE 25 MG/1
25 CAPSULE ORAL DAILY
Qty: 90 CAPSULE | Refills: 3 | OUTPATIENT
Start: 2025-04-21

## 2025-06-16 RX ORDER — SUMATRIPTAN SUCCINATE 100 MG/1
100 TABLET ORAL SEE ADMIN INSTRUCTIONS
Qty: 27 TABLET | Refills: 0 | Status: SHIPPED | OUTPATIENT
Start: 2025-06-16

## 2025-08-28 ENCOUNTER — TELEPHONE (OUTPATIENT)
Dept: INTERNAL MEDICINE | Facility: CLINIC | Age: 55
End: 2025-08-28
Payer: COMMERCIAL

## (undated) DEVICE — SINGLE-USE BIOPSY FORCEPS: Brand: RADIAL JAW 4

## (undated) DEVICE — NDL SPINE 25G 31/2IN BLU

## (undated) DEVICE — Device

## (undated) DEVICE — EPIDURAL TRAY: Brand: MEDLINE INDUSTRIES, INC.

## (undated) DEVICE — SYRINGE, LUER SLIP, STERILE, 60ML: Brand: MEDLINE

## (undated) DEVICE — LASSO POLYPECTOMY SNARE: Brand: LASSO

## (undated) DEVICE — GOWN ,SIRUS,NONREINFORCED 3XL: Brand: MEDLINE

## (undated) DEVICE — CANN O2 ETCO2 FITS ALL CONN CO2 SMPL A/ 7IN DISP LF

## (undated) DEVICE — THE SINGLE USE ETRAP – POLYP TRAP IS USED FOR SUCTION RETRIEVAL OF ENDOSCOPICALLY REMOVED POLYPS.: Brand: ETRAP

## (undated) DEVICE — ADAPT CLN SCPE ENDO PORPOISE BX/50 DISP

## (undated) DEVICE — FLEX ADVANTAGE 1500CC: Brand: FLEX ADVANTAGE

## (undated) DEVICE — GLV SURG TRIUMPH PF LTX 7.5 STRL

## (undated) DEVICE — KT ORCA ORCAPOD DISP STRL

## (undated) DEVICE — GOWN ISOL W/THUMB UNIV BLU BX/15

## (undated) DEVICE — VIAL FORMLN CAP 10PCT 20ML